# Patient Record
Sex: FEMALE | Race: WHITE | NOT HISPANIC OR LATINO | Employment: FULL TIME | URBAN - METROPOLITAN AREA
[De-identification: names, ages, dates, MRNs, and addresses within clinical notes are randomized per-mention and may not be internally consistent; named-entity substitution may affect disease eponyms.]

---

## 2017-02-27 ENCOUNTER — OFFICE VISIT - RIVER FALLS (OUTPATIENT)
Dept: FAMILY MEDICINE | Facility: CLINIC | Age: 47
End: 2017-02-27

## 2017-02-27 ASSESSMENT — MIFFLIN-ST. JEOR: SCORE: 1760.52

## 2017-06-20 ENCOUNTER — OFFICE VISIT - RIVER FALLS (OUTPATIENT)
Dept: FAMILY MEDICINE | Facility: CLINIC | Age: 47
End: 2017-06-20

## 2017-06-20 ASSESSMENT — MIFFLIN-ST. JEOR: SCORE: 1733.31

## 2018-01-22 ENCOUNTER — OFFICE VISIT - RIVER FALLS (OUTPATIENT)
Dept: FAMILY MEDICINE | Facility: CLINIC | Age: 48
End: 2018-01-22

## 2018-01-22 ASSESSMENT — MIFFLIN-ST. JEOR: SCORE: 1865.76

## 2018-01-29 ENCOUNTER — OFFICE VISIT - RIVER FALLS (OUTPATIENT)
Dept: FAMILY MEDICINE | Facility: CLINIC | Age: 48
End: 2018-01-29

## 2018-08-07 ENCOUNTER — OFFICE VISIT - RIVER FALLS (OUTPATIENT)
Dept: FAMILY MEDICINE | Facility: CLINIC | Age: 48
End: 2018-08-07

## 2018-08-07 ASSESSMENT — MIFFLIN-ST. JEOR: SCORE: 1794.65

## 2019-10-15 ENCOUNTER — OFFICE VISIT - RIVER FALLS (OUTPATIENT)
Dept: FAMILY MEDICINE | Facility: CLINIC | Age: 49
End: 2019-10-15

## 2019-10-15 ASSESSMENT — MIFFLIN-ST. JEOR: SCORE: 1814.95

## 2019-12-30 ENCOUNTER — COMMUNICATION - RIVER FALLS (OUTPATIENT)
Dept: FAMILY MEDICINE | Facility: CLINIC | Age: 49
End: 2019-12-30

## 2020-03-26 ENCOUNTER — COMMUNICATION - RIVER FALLS (OUTPATIENT)
Dept: FAMILY MEDICINE | Facility: CLINIC | Age: 50
End: 2020-03-26

## 2020-10-23 ENCOUNTER — OFFICE VISIT - RIVER FALLS (OUTPATIENT)
Dept: FAMILY MEDICINE | Facility: CLINIC | Age: 50
End: 2020-10-23

## 2020-10-27 ENCOUNTER — COMMUNICATION - RIVER FALLS (OUTPATIENT)
Dept: FAMILY MEDICINE | Facility: CLINIC | Age: 50
End: 2020-10-27

## 2021-05-27 ENCOUNTER — RECORDS - HEALTHEAST (OUTPATIENT)
Dept: ADMINISTRATIVE | Facility: CLINIC | Age: 51
End: 2021-05-27

## 2021-07-07 ENCOUNTER — OFFICE VISIT - RIVER FALLS (OUTPATIENT)
Dept: FAMILY MEDICINE | Facility: CLINIC | Age: 51
End: 2021-07-07

## 2021-07-07 ASSESSMENT — MIFFLIN-ST. JEOR: SCORE: 1867.12

## 2021-07-09 ENCOUNTER — TRANSFERRED RECORDS (OUTPATIENT)
Dept: HEALTH INFORMATION MANAGEMENT | Facility: CLINIC | Age: 51
End: 2021-07-09

## 2021-07-09 ENCOUNTER — COMMUNICATION - RIVER FALLS (OUTPATIENT)
Dept: FAMILY MEDICINE | Facility: CLINIC | Age: 51
End: 2021-07-09

## 2021-07-09 LAB — HPV ABSTRACT: NORMAL

## 2022-02-11 VITALS
HEIGHT: 68 IN | TEMPERATURE: 98.6 F | SYSTOLIC BLOOD PRESSURE: 118 MMHG | OXYGEN SATURATION: 96 % | BODY MASS INDEX: 36.92 KG/M2 | WEIGHT: 243.6 LBS | HEART RATE: 85 BPM | DIASTOLIC BLOOD PRESSURE: 74 MMHG

## 2022-02-12 VITALS
TEMPERATURE: 97.8 F | BODY MASS INDEX: 40.44 KG/M2 | HEIGHT: 68 IN | HEIGHT: 68 IN | TEMPERATURE: 99.2 F | OXYGEN SATURATION: 96 % | DIASTOLIC BLOOD PRESSURE: 84 MMHG | WEIGHT: 266.8 LBS | HEART RATE: 72 BPM | DIASTOLIC BLOOD PRESSURE: 70 MMHG | SYSTOLIC BLOOD PRESSURE: 128 MMHG | BODY MASS INDEX: 41.17 KG/M2 | HEART RATE: 90 BPM | SYSTOLIC BLOOD PRESSURE: 124 MMHG

## 2022-02-12 VITALS
WEIGHT: 252 LBS | TEMPERATURE: 98.6 F | DIASTOLIC BLOOD PRESSURE: 80 MMHG | HEART RATE: 78 BPM | HEIGHT: 67 IN | SYSTOLIC BLOOD PRESSURE: 104 MMHG | BODY MASS INDEX: 39.55 KG/M2

## 2022-02-12 VITALS
BODY MASS INDEX: 38.74 KG/M2 | DIASTOLIC BLOOD PRESSURE: 82 MMHG | WEIGHT: 255.6 LBS | HEART RATE: 81 BPM | SYSTOLIC BLOOD PRESSURE: 120 MMHG | HEIGHT: 68 IN | OXYGEN SATURATION: 98 %

## 2022-02-12 VITALS
DIASTOLIC BLOOD PRESSURE: 68 MMHG | SYSTOLIC BLOOD PRESSURE: 122 MMHG | BODY MASS INDEX: 36.01 KG/M2 | HEIGHT: 68 IN | TEMPERATURE: 98.5 F | HEART RATE: 80 BPM | WEIGHT: 237.6 LBS

## 2022-02-12 VITALS
BODY MASS INDEX: 42.47 KG/M2 | DIASTOLIC BLOOD PRESSURE: 84 MMHG | WEIGHT: 270.6 LBS | SYSTOLIC BLOOD PRESSURE: 131 MMHG | HEART RATE: 77 BPM | HEIGHT: 67 IN | OXYGEN SATURATION: 100 % | TEMPERATURE: 97.1 F

## 2022-02-12 VITALS — OXYGEN SATURATION: 96 %

## 2022-02-16 NOTE — PROGRESS NOTES
Patient:   HAMILTON MAURICE            MRN: [id number]            FIN: [id number]               Age:   48 years     Sex:  Female     :  1970   Associated Diagnoses:   Well adult exam; Depression, Recurrent; Asthma   Author:   Darrick Cr MD      Visit Information   Visit type:  Annual exam.    Source of history:  Self.    History limitation:  None.       Chief Complaint   2018 6:41 PM CDT     Physical      Well Adult History   Well Adult History             The patient presents for well adult exam.  The patient's general health status is described as good.  The patient's diet is described as balanced.  Medical encounters: reviewed illnesses from the winter, has been doing well, no issues with asthma, well controlled.        Review of Systems   All other systems reviewed and negative      Health Status   Allergies:    Allergic Reactions (Selected)  Severity Not Documented  Codeine (No reactions were documented)   Medications:  (Selected)   Prescriptions  Prescribed  Advair Diskus 250 mcg-50 mcg inhalation powder: 1 puff(s), INH, BID, # 3 EA, 3 Refill(s), Type: Maintenance  FLUoxetine 40 mg oral capsule: 2 cap(s) ( 80 mg ), po, daily, Instructions: pt will call when this Rx refill is needed, # 60 cap(s), 0 Refill(s), Type: Maintenance, Pharmacy: [facility] #[id number], patient will call when prescription needs to be filled, 2 cap(s) Oral daily,Instr:pt...  ProAir HFA 90 mcg/inh inhalation aerosol: 2 puff(s), inh, qid, PRN: as needed for wheezing, # 1 EA, 3 Refill(s), Type: Maintenance, Pharmacy: [facility] #[id number], patient will call when prescription needs to be filled, 2 puff(s) inh qid,PRN:as needed for wheezing   Problem list:    All Problems  Asthma / ICD-9-.90 / Confirmed  Depression, Recurrent / ICD-9-.30 / Confirmed  Elevated lipids / ICD-9-.4 / Confirmed  Obese / ICD-9-.00 / Probable  Resolved: ASCUS (Atypical Squamous Cells of Undetermined Significance) on Pap Smear /  ICD-9-.01  Resolved: Pregnancy / SNOMED CT 630162222  Resolved: Pregnancy / SNOMED CT 738422734  Resolved: TMJ (Temporomandibular Joint Disorder) / ICD-9-.60      Histories   Past Medical History:    Active  Asthma (ICD-9-.90)  Depression, Recurrent (ICD-9-.30)  Obese (ICD-9-.00)  Elevated lipids (ICD-9-.4)  Resolved  ASCUS (Atypical Squamous Cells of Undetermined Significance) on Pap Smear (ICD-9-.01): Onset on 2012 at 42 years.  Resolved.  TMJ (Temporomandibular Joint Disorder) (ICD-9-.60):  Resolved.  Pregnancy (SNOMED CT 551480263):  Resolved on 3/25/1997 at 26 years.  Pregnancy (SNOMED CT 026981962):  Resolved on 2001 at 31 years.   Family History:    Heart disease  Father  Cancer  Grandfather (M) ()  Comments:  2012 2:09 PM - Ananya Olson LPN  Lung cancer     Procedure history:    NVD (Normal Vaginal Delivery) (650) on 2001 at 31 Years.  Comments:  2011 10:10 AM - Keisha Nina  Female.  NVD (Normal Vaginal Delivery) (650) on 3/25/1997 at 26 Years.  Comments:  2011 10:10 AM - Keisha Nina  Female.  Cholecystectomy (80950482) on 1996 at 25 Years.  Childbirth (8043652644).  Comments:  2011 8:39 AM - Keisha Nina  G4, P2     Social History:        Alcohol Assessment: Current            1-2 times per week, 1 drinks/episode average.  2 drinks/episode maximum.      Tobacco Assessment: Denies Tobacco Use            Never      Substance Abuse Assessment: Denies Substance Abuse            Never      Employment and Education Assessment            Sales      Home and Environment Assessment            Marital status: Life Partner.  Spouse/Partner name: Lloyd Sandhu.      Nutrition and Health Assessment            Type of diet: Regular.      Exercise and Physical Activity Assessment            Exercise frequency: 3-5 X a wk..  Exercise type: Walking.                     Comments:                      2011 -  Ananya Olson LPN                     Does not exercise.      Sexual Assessment            Sexually active: Yes.  Sexual orientation: Heterosexual.  Other contraceptive use: Vasectomy.      Other Assessment            First menses age 12.  Regular menses.  Menstrual duration 7 days.  Cycle interval 28 days.        Physical Examination   Last Menstrual Period: 7/22/2018   Vital Signs   8/7/2018 6:41 PM CDT Temperature Tympanic 98.6 DegF    Peripheral Pulse Rate 78 bpm    Pulse Site Radial artery    HR Method Manual    Systolic Blood Pressure 104 mmHg    Diastolic Blood Pressure 80 mmHg    Mean Arterial Pressure 88 mmHg    BP Site Left arm    BP Method Manual      Measurements from flowsheet : Measurements   8/7/2018 6:41 PM CDT Height Measured - Standard 67.25 in    Weight Measured - Standard 252 lb    BSA 2.33 m2    Body Mass Index 39.17 kg/m2  HI      General:  Alert and oriented, No acute distress.    Eye:  Pupils are equal, round and reactive to light, Extraocular movements are intact, Normal conjunctiva.    HENT:  Normocephalic, Tympanic membranes are clear, Oral mucosa is moist, No pharyngeal erythema.    Neck:  Supple, Non-tender, No lymphadenopathy, No thyromegaly.    Respiratory:  Lungs are clear to auscultation.    Cardiovascular:  Normal rate, Regular rhythm.    Breast:  No mass, No tenderness, No discharge.    Gastrointestinal:  Soft, Non-tender, Non-distended, No organomegaly.    Musculoskeletal:  Normal range of motion, Normal strength.    Integumentary:  Warm, Dry, Pink, No rash, no concerning lesions.    Neurologic:  Alert, Oriented, Normal sensory.    Psychiatric:  Cooperative, Appropriate mood & affect.       Impression and Plan   Diagnosis     Well adult exam (OUX58-XR Z00.00).     Depression, Recurrent (DTZ61-YM F33.9).     Asthma (IPJ91-ZN J45.909).     Course:  Progressing as expected.    Patient Instructions:       Counseled: Patient, BMI, diet, and exercise.    Orders     Orders (Selected)    Outpatient Orders  Ordered  Return to Clinic (Request): RFV: fasting lab visit: lipid, glucose, Return in 4 weeks  Prescriptions  Prescribed  Advair Diskus 250 mcg-50 mcg inhalation powder: 1 puff(s), INH, BID, # 3 EA, 3 Refill(s), Type: Maintenance, Pharmacy: [facility] #[id number], hold on file, patient will call when needed, 1 puff(s) Inhale bid  FLUoxetine 40 mg oral capsule: 2 cap(s) ( 80 mg ), po, daily, # 180 cap(s), 3 Refill(s), Type: Maintenance, Pharmacy: [facility] #[id number], 2 cap(s) Oral daily  ProAir HFA 90 mcg/inh inhalation aerosol: 2 puff(s), inh, qid, PRN: as needed for wheezing, # 1 EA, 3 Refill(s), Type: Maintenance, Pharmacy: [facility] #[id number], hold on file, patient will call when needed, 2 puff(s) Inhale qid,PRN:as needed for wheezing.

## 2022-02-16 NOTE — TELEPHONE ENCOUNTER
---------------------  From: Brook Cruz MA (Phone Messages Pool ([id number]_[facility]))   To: Darrick Cr MD;     Sent: 2/4/2019 9:31:51 AM CST  Subject: CONSUMER MESSAGE: change of prescription           ---------------------  From: LILIA BURNHAM  To: [facility]  Sent: 02/04/2019 09:28 a.m. CST  Subject: change of prescription  Good morning...    I m wondering if you would consider changing me from Advair to something similar that would be less costly.  I went to fill the Advair on Friday for the first time since the prescription was renewed.  While I need the meds, the cost is just so prohibitive.  I have my prescriptions at [facility] in  now.    Please advise.    Thank you,  Lilia Burnham  [birth year]  [phone]  ** Submitted: **  Order:fluticasone-salmeterol (AirDuo RespiClick 232 mcg-14 mcg/inh inhalation powder)  1 puff(s)  Inhale  bid  Qty:  1 EA        Refills:  11          Substitutions Allowed     Route To Pharmacy - [facility] [id number]    Signed by Darrick Cr MD  2/4/2019 9:45:00 AMLisa,   I sent in AirDuo which is usually less expensive, but the tricky part is that each insurance covers medications differently. Do you recall if you received anything in the mail or online from your insurance that includes a list of preferred drugs? Unfortunately, insurance will not share that directly with clinics or pharmacies, but will often have it available to their insured patients. If the airduo is also too expensive and you have access to a preferred drug list, I'm happy to take a peek and help find a better option for you. You can also check online by googling the name of the medication (Advair or AirDuo) and look for copay cards that can decrease the cost of the medication.  Hope this helps. Let me know what more I can do.  Tiny Cr---------------------  From: Shaye SWEET, Darrick   To: Phone Messages Pool ([id number]_[facility]); LILIA BURNHAM    Sent:  2/4/2019 9:51:30 AM CST  Subject: RE: CONSUMER MESSAGE: change of prescription

## 2022-02-16 NOTE — LETTER
(Inserted Image. Unable to display)   September 08, 2021  HAMILTON MAURICE  [address] [place] [address]          Dear HAMILTON,      Thank you for selecting [facility] for your healthcare needs.    Our records indicate you are due for the following services:     Screening Test for Colon Cancer, called InSure FIT ~ Stool cards were sent home with you within the past 3 months and have not been returned to us for testing. You may either drop off your stool cards at your clinic, or send them to us in the mail.    (FYI   Regarding office visits: In some instances, a video visit or telephone visit may be offered as an option.)        To schedule an appointment or if you have further questions, please contact your clinic at [phone].      Powered by [employer]    Sincerely,    Darrick Cr M.D.

## 2022-02-16 NOTE — NURSING NOTE
Comprehensive Intake Entered On:  7/7/2021 4:04 PM CDT    Performed On:  7/7/2021 4:00 PM CDT by Cait Yousif CMA               Summary   Chief Complaint :   Physical - Last menses - January, no previous colon screen- no FHX. ,mammo scheduled 7/27   Menstrual Status :   Menarcheal   Weight Measured :   270.6 lb(Converted to: 270 lb 10 oz, 122.742 kg)    Height Measured :   66.5 in(Converted to: 5 ft 6 in, 168.91 cm)    Body Mass Index :   43.02 kg/m2 (HI)    Body Surface Area :   2.4 m2   Systolic Blood Pressure :   131 mmHg (HI)    Diastolic Blood Pressure :   84 mmHg (HI)    Mean Arterial Pressure :   100 mmHg   Peripheral Pulse Rate :   77 bpm   Temperature Tympanic :   97.1 DegF(Converted to: 36.2 DegC)  (LOW)    Oxygen Saturation :   100 %   Cait Yousif CMA - 7/7/2021 4:00 PM CDT   Health Status   Hospitalized since last visit? :   No   Inpatient? :   No   ED? :   No   Related to Condition :   Asthma   Cait Yousif CMA - 7/7/2021 4:57 PM CDT   Allergies Verified? :   Yes   Medication History Verified? :   Yes   Immunizations Current :   Yes   Medical History Verified? :   Yes   Pre-Visit Planning Status :   Completed   Tobacco Use? :   Never smoker   Cait Yousif CMA - 7/7/2021 4:00 PM CDT   Social History   Social History   (As Of: 7/7/2021 4:04:52 PM CDT)   Alcohol:  Current      1-2 times per week, 1 drinks/episode average.  2 drinks/episode maximum.   (Last Updated: 6/20/2017 5:33:41 PM CDT by Ananya Olson LPN)          Tobacco:  Denies Tobacco Use      Never   (Last Updated: 11/2/2011 4:07:58 PM CDT by Ananya Olson LPN)   Never (less than 100 in lifetime)   (Last Updated: 7/7/2021 4:01:55 PM CDT by Cait Yousif CMA)          Electronic Cigarette/Vaping:        Electronic Cigarette Use: Never.   (Last Updated: 7/7/2021 4:01:59 PM CDT by Cait Yousif CMA)          Substance Abuse:  Denies Substance Abuse      Never   (Last Updated: 11/2/2011 4:08:08 PM CDT by Ananya Olson LPN)           Employment/School:        Work/School description: .   (Last Updated: 8/28/2018 1:40:24 PM CDT by Keisha Nina)          Home/Environment:        Marital status: Life Partner.  Spouse/Partner name: Lloyd Sandhu.   (Last Updated: 1/8/2013 12:16:33 PM CST by Keisha Nina)          Nutrition/Health:        Type of diet: Regular.   (Last Updated: 11/2/2011 4:08:44 PM CDT by Ananya Olson LPN)          Exercise:  Regular exercise      Exercise frequency: 3-4 times/week.  Exercise type: Walking.   Comments:  11/2/2011 4:08 PM - Ananya Olson LPN: Does not exercise.   (Last Updated: 8/28/2018 1:50:13 PM CDT by Keisha Nina)          Sexual:        Sexually active: Yes.  Sexual orientation: Heterosexual.  Other contraceptive use: Vasectomy.   (Last Updated: 1/8/2013 12:16:48 PM CST by Keisha Nina)          Other:        First menses age 12.  Regular menses.  Menstrual duration 7 days.  Cycle interval 28 days.  History of abnormal Pap.   (Last Updated: 8/28/2018 1:39:05 PM CDT by Keisha Nina)

## 2022-02-16 NOTE — NURSING NOTE
Asthma Control Test (ACT) Total Entered On:  7/7/2021 4:57 PM CDT    Performed On:  7/7/2021 4:57 PM CDT by Cait Yousif CMA               Asthma Control Test (ACT) Total   Asthma Control Test Total (Adult) :   24    Cait Yousif CMA - 7/7/2021 4:57 PM CDT

## 2022-02-16 NOTE — LETTER
(Inserted Image. Unable to display)   [address] [facility] [address]  [phone] (phone) [phone] (fax)  July 30, 2021        HAMILTON MAURICE  [address] [place] [address]      Dear HAMILTON,      Thank you for selecting [facility] for your [facility] needs.      This letter is to inform you that we have received a copy of your mammogram results.  Your results were normal.  You will also receive notice of your results from the radiologist within 7-10 days.  This letter is to let you know I have also received a copy and it is filed in your clinic chart.      Please plan on having your next mammogram done in 12 months.       Please contact me or my assistant at [phone] if you have any questions or concerns.     Sincerely,      Darrick Cr MD

## 2022-02-16 NOTE — TELEPHONE ENCOUNTER
---------------------  From: Vida Mitchell CMA (Phone Messages Pool ([id number]_[facility]))   To: LILIA BURNHAM    Sent: 3/26/2020 1:31:11 PM CDT  Subject: RE: Asthma Action Plan     Thank you!      ---------------------  From: LILIA BURNHAM  To: [facility]  Sent: 03/26/2020 12:05 p.m. CDT  Subject: Asthma Action Plan  Good afternoon!    Thank you for sending the Asthma Action Plan. Since we aren t able to reply in the form itself, please use this as my response.    Thankfully I have had no issues with my asthma in the recent months. I had a slight cold about a month ago that caused a bit of a challenge but it went away within a day. I have stocked up on all of my meds in the event that I need them and they are hard to get during the crisis.    Blessed to have a job that I m working from home and don t intend to leave until the air is clear, every where.    Be safe everyone and thank you for ALL that you do!  Lilia Burnham

## 2022-02-16 NOTE — TELEPHONE ENCOUNTER
---------------------  From: Xiomara Coy RN (Phone Messages Pool ([id number]_[facility]))   To: Advanced Practice Provider Pool ([id number]_[facility]);     Sent: 9/17/2021 1:37:53 PM CDT  Subject: Inhaler Issues        PCP:   MAYA      Time of Call:  1:20       Person Calling:  Pt  Phone number:  [phone]    Note:   Pt calling to say that she went to  the new Advair inhaler that was sent in on 9/15/21 was going to cost her $500.  She really wants the fluticasone-salmeterol 232/14 that was sent in July 7th, 2021 but [facility] said they are all out of it.   Note in chart says that [facility] called to tell us that it is not covered by her insurance. She is wondering if there are any other types of inhalers that are similar and not $500.  She is completely out of her inhaler now.  Pt states she is in town and will be for a little bit and is hoping this can be addressed quickly.  Pt informed that [facility] is [facility] but will reach out to other providers for assistance.     Last office visit and reason:  7/8/21 Well Adult Visit---------------------  From: Filomena Collins PA-C (Advanced Practice Provider Pool (32224[facility]))   To: Phone Messages Pool ([id number]_[place] [facility] [place] [facility]);     Sent: 9/17/2021 2:57:53 PM CDT  Subject: RE: Inhaler Issues      It looks like the advair discus 250 may be covered (better).  I sent RX.    All are going to be expensive generally, but if ongoing issues we can have her discuss with Rissa Woody.Called pt to inform her that this new inhaler was sent in to [facility].  Pt was advised that all inhalers are generally expensive and if this one is also close to $500, she might want to meet with Sofia to discuss other options.  Pt was thankful that this was taken care of in a timely fashion and that there was a third option for her to get the medication she needs.

## 2022-02-16 NOTE — NURSING NOTE
CAGE Assessment Entered On:  10/15/2019 5:56 PM CDT    Performed On:  10/15/2019 5:56 PM CDT by Holly Goodwin CMA               Assessment   Have you ever felt you should cut down on your drinking :   No   Have people annoyed you by criticizing your drinking :   No   Have you ever felt bad or guilty about your drinking :   No   Have you ever taken a drink first thing in the morning to steady your nerves or get rid of a hangover (Eye-opener) :   No   CAGE Score :   0    Holly Goodwin CMA - 10/15/2019 5:56 PM CDT

## 2022-02-16 NOTE — NURSING NOTE
Depression Screening Entered On:  10/27/2020 4:13 PM CDT    Performed On:  10/27/2020 4:13 PM CDT by Mitra Milner CMA               Depression Screening   Little Interest - Pleasure in Activities :   Not at all   Feeling Down, Depressed, Hopeless :   Not at all   Initial Depression Screen Score :   0 Score   Poor Appetite or Overeating :   Not at all   Trouble Falling or Staying Asleep :   Not at all   Feeling Tired or Little Energy :   Not at all   Feeling Bad About Yourself :   Not at all   Trouble Concentrating :   Not at all   Moving or Speaking Slowly :   Not at all   Thoughts Better Off Dead or Hurting Self :   Not at all   MAURICIO Difficulty with Work, Home, Others :   Not difficult at all   Detailed Depression Screen Score :   0    Total Depression Screen Score :   0    Mitra Milner CMA - 10/27/2020 4:13 PM CDT   spontaneous/unlabored

## 2022-02-16 NOTE — TELEPHONE ENCOUNTER
---------------------  From: Darrick Cr MD   To: Mitra Milner CMA;     Sent: 9/15/2021 3:59:03 PM CDT  Subject: RE: General Message           Entered by Darrick Cr MD on September 15, 2021 3:58:53 PM CDT    ** Submitted: **  Order:fluticasone-salmeterol (Advair  mcg-21 mcg/inh inhalation aerosol)  2 puff(s)  Inhale  bid  Qty:  1 EA        Refills:  11          Substitutions Allowed     Route To Pharmacy - [facility] #[id number]    Signed by Darrick Cr MD  9/15/2021 8:57:00 PM [facility]            ---------------------  From: Mitra Milner CMA   To: Darrick Cr MD;     Sent: 9/15/2021 3:55:12 PM CDT  Subject: General Message     Regarding fax from [employer] is not covered    Advair /21 or Advair Diskus 250 is covered and closest to [employer] per pharmacy.

## 2022-02-16 NOTE — PROGRESS NOTES
Chief Complaint    Curbside O2 check after video visit.  History of Present Illness      Today's visit was conducted via video due to the COVID-19 pandemic. PT consent to video visit was obtained and documented       Call Start Time:  1:08      Call End Time:   1:22      Pt reports she was diagnosed with Covid 19 tested at another facility.  Her  was positive as well.  She has known asthma, well controlled with Advair.  She has had sx for 10 days.  She was generally feeling better but then this am worse again. Had  a severe bout of cough this am, followed by sob with ambulation.  she denies sob at rest.  She is no longer with fever but does have chills a times.  Temp running lower than normal at times well.  No chest pain.  Getting along well at home otherwise, very concerned about asthma exacerbation as well as worsening sx over the weekend.  In need of albuterol refill.  Feels tight and wheezy like her typical asthma exacerbatoins. Wonders if antibiotic needed.       also requests refill of her advair and her antidepressant. Given her covid 19 illashley has not been able to get into the clinic.        AAP and ACT were updated 6 months ago.   Review of Systems      Review of systems is negative with the exception of those noted in HPI          Physical Exam   Vitals & Measurements    SpO2: 96%            nad appears well. Able to talk in full sentance.  No tachypnea or dyspnea noted.       Drive up curbside O2 sat done today 96% on room air.  Assessment/Plan       1. Asthma with acute exacerbation (J45.901)         short coures of prednisine and albuterol refilled. Refilled Advair as well.  Close observation. no indication for antibiotics but if worsening, fevers, she should be seen in clinic or ED.  She is agreeable with plan.  ACT/AAP were updated and well controlled until recent covid 19 virus.       2. COVID-19 (U07.1)         as above       3. Depression, Recurrent (F33.9)         1 month refill of her  SSRI, would like her to fu with pcp with in a month  to do yearly recheck.  Pt agreeable.       Orders:         albuterol, 2 puff(s), Inhale, q4 hrs, # 17 gm, 3 Refill(s), Type: Maintenance, Pharmacy: [facility] #[id number], 2 puff(s) Inhale q4 hrs, 67.5, in, 10/15/19 16:25:00 CDT, Height Measured, 255.6, lb, 10/15/19 16:25:00 CDT, Weight Measured, (Ordered)         FLUoxetine, = 2 cap(s), Oral, daily, # 60 cap(s), 0 Refill(s), Type: Maintenance, Pharmacy: [facility] #[id number], Due for annual Px prior to next refill., 2 cap(s) Oral daily, 67.5, in, 10/15/19 16:25:00 CDT, Height Measured, 255.6, lb, 10/15/19 16:25:00 C..., (Ordered)         FLUoxetine, = 2 cap(s), Oral, daily, # 180 cap(s), 3 Refill(s), Type: Hard Stop, Pharmacy: [facility] #[id number], Due for annual Px prior to next refill., (Completed)         fluticasone-salmeterol, 1 puff, Inhale, bid, # 1 EA, 11 Refill(s), Type: Maintenance, Pharmacy: [facility] #[id number], 1 puff Inhale bid, 67.5, in, 10/15/19 16:25:00 CDT, Height Measured, 255.6, lb, 10/15/19 16:25:00 CDT, Weight Measured, (Ordered)         fluticasone-salmeterol, 1 puff, Inhale, bid, # 1 EA, 11 Refill(s), Type: Hard Stop, (Completed)         predniSONE, = 2 tab(s) ( 40 mg ), Oral, daily, x 5 day(s), # 10 tab(s), 0 Refill(s), Type: Acute, Pharmacy: [facility] #[id number], 2 tab(s) Oral daily,x5 day(s), 67.5, in, 10/15/19 16:25:00 CDT, Height Measured, 255.6, lb, 10/15/19 16:25:00 CDT, Weight Lucia..., (Ordered)  Patient Information     Name:HAMILTON MAURICE      Address:      [address] [place] [id number]     Sex:Female     YOB: 1970     [phone]     Emergency Contact:NANDO TEIXEIRA     MRN:[id number]     FIN:[id number]     Location:[facility]     Date of Service:10/23/2020      Primary Care Physician:       Shaye SWEET, Darrick, [phone]      Attending Physician:       Karina SHELDON, Filomena HANNON, [phone]  Problem List/Past Medical History    Ongoing     Asthma     Depression, Recurrent     Obese    Historical     ASCUS (Atypical Squamous Cells of Undetermined Significance) on Pap Smear     Elevated lipids     Pregnancy     Pregnancy     TMJ (Temporomandibular Joint Disorder)  Procedure/Surgical History     NVD (Normal Vaginal Delivery) (08/16/2001)            Comments: Female..     NVD (Normal Vaginal Delivery) (03/25/1997)            Comments: Female..     Cholecystectomy (01/01/1996)           Childbirth            Comments: G4, P2.  Medications    Advair Diskus 250 mcg-50 mcg inhalation powder, 1 puff(s), Inhale, bid, 11 refills    albuterol 90 mcg/inh inhalation aerosol, 2 puff(s), Inhale, q4 hrs, 3 refills    FLUoxetine 40 mg oral capsule, 2 cap(s), Oral, daily    fluticasone-salmeterol 232 mcg-14 mcg/inh inhalation powder, 1 inh, Inhale, bid, 11 refills    fluticasone-salmeterol CFC free 230 mcg-21 mcg/inh inhalation aerosol, 1 puff, Inhale, bid, 11 refills    predniSONE 20 mg oral tablet, 40 mg= 2 tab(s), Oral, daily    ProAir HFA 90 mcg/inh inhalation aerosol, 2 puff(s), Inhale, qid, PRN, 3 refills  Allergies    codeine  Social History    Smoking Status     Never smoker     Alcohol - Current      1-2 times per week, 1 drinks/episode average. 2 drinks/episode maximum.     Employment/School      Work/School description: .     Exercise - Regular exercise      Exercise frequency: 3-4 times/week. Exercise type: Walking.     Home/Environment      Marital status: Life Partner. Spouse/Partner name: Lloyd Sandhu.     Nutrition/Health      Type of diet: Regular.     Other      First menses age 12. Regular menses. Menstrual duration 7 days. Cycle interval 28 days. History of abnormal Pap.     Sexual      Sexually active: Yes. Sexual orientation: Heterosexual. Other contraceptive use: Vasectomy.     Substance Abuse - Denies Substance Abuse      Never     Tobacco - Denies Tobacco Use       Never  Family History    CVA - Cerebrovascular accident: Father.    Cancer: Grandfather (M).    Heart disease: Father.  Immunizations      Vaccine Date Status          pneumococcal (PPSV23) 08/07/2018 Given          tetanus/diphth/pertuss (Tdap) adult/adol 12/05/2012 Given          DTaP 12/05/2012 Recorded          Td 09/22/2003 Recorded          influenza virus vaccine, inactivated 01/22/2002 Recorded

## 2022-02-16 NOTE — TELEPHONE ENCOUNTER
---------------------  From: Vida Mitchell CMA   To: HAMILTON MAURICE    Sent: 3/26/2020 11:00:38 AM CDT  Subject: Asthma Update     Our records show that you are due to complete your Asthma Control Action Plan. Please complete the form below and send it back. If you have any questions please let us know. Thank you in advance for updating your Asthma Action Plan.    If you have any concerns about your Asthma please call our clinic at [phone] and ask speak with one of the nurses.     Thank You    1. In the last 4 weeks how much time did our asthma keep you from getting as much done at work, school or at home?   1. All of the time  2. Most of the time  3. Some of the time  4. A little of the time  5. None of the time  1. During the past 4 weeks, how often have you had shortness of breath?  1. More than once a day  2. Once a day  3. 3-6 times a week  4. Once or twice a week  5. Not at all  1. During the past 4 weeks, how often did your asthma symptoms (wheezing, coughing, shortness of breath, chest tightness or pain) wake you up at night or earlier in the morning?  1. 4 or more nights a week  2. 2 or 3 nights a week  3. Once a week  4. Once or twice   5. Not at all  1. During the past 4 weeks, how often have you used your rescue inhaler or nebulizer medication (such as albuterol)?  1. 3 or more times per day  2. 1 or 2 times per day  3. 2 or 3 times per week  4. Once a week or less  5. Not at all  1. How would you rate your asthma control during the past 4 weeks?  1. Not controlled at all  2. Poorly controlled  3. Somewhat controlled  4. Well controlled  5. Completely controlled    In the past 12 months, how many emergency department visits have you had due to asthma (that did not result in a hospitalization)? YES or NO  In the past 12 months, how many inpatient hospitalizations have had due to asthma?

## 2022-02-16 NOTE — PROGRESS NOTES
Chief Complaint    Depression med check; discuss pneumonia vaccine  History of Present Illness      1. depression follow up, mood has been down a little, attributes to weight gain, has gotten off track, denies need for any assistance, would like to try lifestyle changes before any medication changes, continue fluoxetine, refills needed      2. asthma generally well controlled, does not use advair as regularly as she should due to cost, no other concerns      preventive screenings are up to date  Physical Exam   Vitals & Measurements    HR: 81(Peripheral)  BP: 120/82  SpO2: 98%     HT: 67.5 in  WT: 255.6 lb  BMI: 39.44       patient is alert and oriented, cooperative, in no distress      eyes: PERRL, EOM intact, normal conjunctiva      heent: normocephalic, TMs normal, canals patent, no sinus tenderness, oral mucosa moist, no oral lesions, normal hypopharynx      neck: supple, no lymphadenopathy, no thyromegaly      CV: RRR, no murmur, no edema, normal peripheral perfusion      lungs: clear and equal bilaterally, no rales, rhonchi, or wheezes   Assessment/Plan       1. Depression, Recurrent (F33.9)         mild increase on PHQ9, patient will continue current medication, increase exercise, monitor diet, follow up as needed if any further concerns         Ordered:          FLUoxetine, = 2 cap(s), Oral, daily, # 180 cap(s), 3 Refill(s), Type: Maintenance, Pharmacy: [facility] #[id number], Due for annual Px prior to next refill., (Ordered)                2. Asthma (J45.909)         well controlled, will check with pharmacy which formulation of fluticasone-salmeterol is least expensive, let us know if still unable to afford so we can look into alternatives         Ordered:          albuterol, 2 puff(s), inh, qid, PRN: as needed for wheezing, # 1 EA, 3 Refill(s), Type: Maintenance, Pharmacy: [facility] #[id number], hold on file, patient will call when needed, (Ordered)          fluticasone-salmeterol, 1 puff(s),  INH, BID, # 1 EA, 11 Refill(s), Type: Maintenance, Pharmacy: [facility] #[id number], hold on file, patient will call when needed, (Ordered)          fluticasone-salmeterol, 1 puff, Inhale, bid, # 1 EA, 11 Refill(s), Type: Maintenance, (Ordered)          fluticasone-salmeterol, 1 inh, Inhale, bid, # 1 EA, 11 Refill(s), Type: Maintenance, (Ordered)           Patient Information     Name:HAMILTON MAURICE      Address:      [address] [place], [place] [id number]     Sex:Female     YOB: 1970     [phone]     Emergency Contact:NANDO TEIXEIRA     MRN:[id number]     FIN:[id number]     Location:[facility]     Date of Service:10/15/2019      Primary Care Physician:       Darrick Cr MD, [phone]      Attending Physician:       Darrick Cr MD, [phone]  Problem List/Past Medical History    Ongoing     Asthma     Depression, Recurrent     Obese    Historical     ASCUS (Atypical Squamous Cells of Undetermined Significance) on Pap Smear     Elevated lipids     Pregnancy     Pregnancy     TMJ (Temporomandibular Joint Disorder)  Procedure/Surgical History     NVD (Normal Vaginal Delivery) (08/16/2001)            Comments: Female..     NVD (Normal Vaginal Delivery) (03/25/1997)            Comments: Female..     Cholecystectomy (01/01/1996)           Childbirth            Comments: G4, P2.  Medications    Advair Diskus 250 mcg-50 mcg inhalation powder, 1 puff(s), Inhale, bid, 11 refills    FLUoxetine 40 mg oral capsule, 2 cap(s), Oral, daily, 3 refills    fluticasone-salmeterol 232 mcg-14 mcg/inh inhalation powder, 1 inh, Inhale, bid, 11 refills    fluticasone-salmeterol CFC free 230 mcg-21 mcg/inh inhalation aerosol, 1 puff, Inhale, bid, 11 refills    ProAir HFA 90 mcg/inh inhalation aerosol, 2 puff(s), Inhale, qid, PRN, 3 refills  Allergies    codeine  Social History    Smoking Status - 10/15/2019     Never smoker     Alcohol - Current, 01/19/2011      1-2  times per week, 1 drinks/episode average. 2 drinks/episode maximum., 06/20/2017     Employment/School      Work/School description: ., 08/28/2018     Exercise - Regular exercise, 08/28/2018      Exercise frequency: 3-4 times/week. Exercise type: Walking., 08/28/2018     Home/Environment      Marital status: Life Partner. Spouse/Partner name: Lloyd Sandhu., 01/08/2013     Nutrition/Health      Type of diet: Regular., 11/02/2011     Other      First menses age 12. Regular menses. Menstrual duration 7 days. Cycle interval 28 days. History of abnormal Pap., 08/28/2018     Sexual      Sexually active: Yes. Sexual orientation: Heterosexual. Other contraceptive use: Vasectomy., 01/08/2013     Substance Abuse - Denies Substance Abuse, 01/08/2013      Never, 11/02/2011     Tobacco - Denies Tobacco Use, 01/19/2011      Never, 11/02/2011  Family History    CVA - Cerebrovascular accident: Father.    Cancer: Grandfather (M).    Heart disease: Father.  Immunizations      Vaccine Date Status      pneumococcal (PPSV23) 08/07/2018 Given      tetanus/diphth/pertuss (Tdap) adult/adol 12/05/2012 Given      DTaP 12/05/2012 Recorded      Td 09/22/2003 Recorded      influenza virus vaccine, inactivated 01/22/2002 Recorded

## 2022-02-16 NOTE — PROGRESS NOTES
Patient:   HAMILTON MAURICE            MRN: [id number]            FIN: [id number]               Age:   47 years     Sex:  Female     :  1970   Associated Diagnoses:   Atypical pneumonia   Author:   Darrick Cr MD      Chief Complaint   2018 8:26 AM CST    c/o cough x 12 days; chills at night x 2 days        History of Present Illness   Patient with cough x 12 days, occasionally productive.  Then over the past 48 hours chills, increased cough, sore throat, ear fullness      Health Status   Allergies:    Allergic Reactions (All)  Severity Not Documented  Codeine (No reactions were documented)   Medications:  (Selected)   Prescriptions  Prescribed  Advair Diskus 250 mcg-50 mcg inhalation powder: 1 puff(s), INH, BID, # 3 EA, 3 Refill(s), Type: Maintenance  FLUoxetine 40 mg oral capsule: 2 cap(s) ( 80 mg ), po, daily, Instructions: pt will call when this Rx refill is needed, # 180 cap(s), 3 Refill(s), Type: Maintenance, Pharmacy: [facility] #[id number], patient will call when prescription needs to be filled, 2 cap(s) po daily,Instr:pt...  ProAir HFA 90 mcg/inh inhalation aerosol: 2 puff(s), inh, qid, PRN: as needed for wheezing, # 1 EA, 3 Refill(s), Type: Maintenance, Pharmacy: [facility] #[id number], patient will call when prescription needs to be filled, 2 puff(s) inh qid,PRN:as needed for wheezing   Problem list:    All Problems (Selected)  Obese / ICD-9-.00 / Probable  Elevated lipids / ICD-9-.4 / Confirmed  Depression, Recurrent / ICD-9-.30 / Confirmed  Asthma / ICD-9-.90 / Confirmed      Histories   Past Medical History:    Active  Asthma (ICD-9-.90)  Depression, Recurrent (ICD-9-.30)  Obese (ICD-9-.00)  Elevated lipids (ICD-9-.4)  Resolved  ASCUS (Atypical Squamous Cells of Undetermined Significance) on Pap Smear (ICD-9-.01): Onset on 2012 at 42 years.  Resolved.  TMJ (Temporomandibular Joint Disorder) (ICD-9-.60):  Resolved.  Pregnancy  (SNOMED CT 811091110):  Resolved on 3/25/1997 at 26 years.  Pregnancy (SNOMED CT 402797156):  Resolved on 2001 at 31 years.   Family History:    Heart disease  Father  Cancer  Grandfather (M) ()  Comments:  2012 2:09 PM - Whitney MILLEROlesyan  Lung cancer     Procedure history:    NVD (Normal Vaginal Delivery) (650) on 2001 at 31 Years.  Comments:  2011 10:10 AM - Keisha Nina  Female.  NVD (Normal Vaginal Delivery) (650) on 3/25/1997 at 26 Years.  Comments:  2011 10:10 AM - Keisha Nina  Female.  Cholecystectomy (37339889) on 1996 at 25 Years.  Childbirth (0870760424).  Comments:  2011 8:39 AM - Keisha Nina  G4, P2     Social History:        Alcohol Assessment: Current            1-2 times per week, 1 drinks/episode average.  2 drinks/episode maximum.      Tobacco Assessment: Denies Tobacco Use            Never      Substance Abuse Assessment: Denies Substance Abuse            Never      Employment and Education Assessment            Sales      Home and Environment Assessment            Marital status: Life Partner.  Spouse/Partner name: Lloyd Sandhu.      Nutrition and Health Assessment            Type of diet: Regular.      Exercise and Physical Activity Assessment            Exercise frequency: 3-5 X a wk..  Exercise type: Walking.                     Comments:                      2011 - Whitney MILLERAnanya                     Does not exercise.      Sexual Assessment            Sexually active: Yes.  Sexual orientation: Heterosexual.  Other contraceptive use: Vasectomy.      Other Assessment            First menses age 12.  Regular menses.  Menstrual duration 7 days.  Cycle interval 28 days.        Physical Examination   Vital Signs   2018 8:26 AM CST Temperature Tympanic 97.8 DegF  LOW    Peripheral Pulse Rate 72 bpm    Pulse Site Radial artery    HR Method Manual    Systolic Blood Pressure 124 mmHg    Diastolic Blood Pressure 70 mmHg    Mean Arterial  Pressure 88 mmHg    BP Site Left arm    BP Method Manual      Measurements from flowsheet : Measurements   1/22/2018 8:26 AM CST Height Measured - Standard 67.5 in    Weight Measured - Standard 266.8 lb    BSA 2.4 m2    Body Mass Index 41.17 kg/m2  HI      General:  Alert and oriented, No acute distress.    Eye:  Pupils are equal, round and reactive to light, Normal conjunctiva.    HENT:  Normocephalic, Tympanic membranes are clear, Oral mucosa is moist, No pharyngeal erythema, No sinus tenderness.    Neck:  Supple, Non-tender, No lymphadenopathy.    Respiratory:       Breath sounds: Bilateral, Rhonchi present, diffusely.    Cardiovascular:  Normal rate, Regular rhythm.       Impression and Plan   Diagnosis     Atypical pneumonia (COF32-RN J18.9).     Course:  Not improving.    Summary:  Resent Advair as requested..    Orders     Orders   Pharmacy:  azithromycin 250 mg oral tablet (Prescribe): 1 packet(s), PO, Once, Instructions: as directed on package labeling, # 6 tab(s), 0 Refill(s), Type: Soft Stop, Pharmacy: [facility] #[id number], 1 packet(s) po once,Instr:as directed on package labeling.

## 2022-02-16 NOTE — NURSING NOTE
Depression Screening Entered On:  10/15/2019 5:56 PM CDT    Performed On:  10/15/2019 5:56 PM CDT by Holly Goodwin CMA               Depression Screening   Little Interest - Pleasure in Activities :   Not at all   Feeling Down, Depressed, Hopeless :   Not at all   Initial Depression Screen Score :   0    Trouble Falling or Staying Asleep :   Not at all   Feeling Tired or Little Energy :   Not at all   Poor Appetite or Overeating :   Not at all   Feeling Bad About Yourself :   Not at all   Trouble Concentrating :   Not at all   Moving or Speaking Slowly :   Not at all   Thoughts Better Off Dead or Hurting Self :   Not at all   Detailed Depression Screen Score :   0    Total Depression Screen Score :   0    MAURICIO Difficulty with Work, Home, Others :   Somewhat difficult   Holly Goodwin CMA - 10/15/2019 5:56 PM CDT

## 2022-02-16 NOTE — TELEPHONE ENCOUNTER
---------------------  From: Ramona Tavarez CMA (Phone Messages Pool ([id number]_[facility]))   To: HAMILTON BURNHAM    Sent: 10/27/2020 1:58:07 PM CDT  Subject: RE: checking back     Hamilton,    Your previous message was sent, although Dr Cr does not start in clinic until 3pm today. She will address your message after this time.    Ramona HIGGINS CMA      ---------------------  From: HAMILTON BURNHAM  To: [facility]  Sent: 10/27/2020 01:41 p.m. CDT  Subject: checking back  Hoping for a resolution/response on my message from earlier this morning concerning a refill on prednisone, etc.    Thank you,  Hamilton Burnham

## 2022-02-16 NOTE — PROGRESS NOTES
Patient:   HAMILTON MAURICE            MRN: [id number]            FIN: [id number]               Age:   47 years     Sex:  Female     :  1970   Associated Diagnoses:   Asthma; Cough   Author:   Cristopher Mixon MD      Chief Complaint   2018 8:02 AM CST    c/o cough, chest congestion, body aches continue     Chief complaint and symptoms noted above confirmed with patient.      History of Present Illness             The patient presents with cough.  The cough is described as barking and hacking.  The severity of the cough is moderate.  The cough is constant.  Resolved with prednisone but returned right away after stopping.  Exacerbating factors consist of none.  Relieving factors consist of beta-agonist, rest, sitting upright and Steroids.        Review of Systems   Constitutional:  Negative except as documented in history of present illness.    Ear/Nose/Mouth/Throat:  Negative.    Respiratory:  Negative except as documented in history of present illness.    Cardiovascular:  Negative.       Health Status   Allergies:    Allergic Reactions (Selected)  Severity Not Documented  Codeine (No reactions were documented)   Medications:  (Selected)   Prescriptions  Prescribed  Advair Diskus 250 mcg-50 mcg inhalation powder: 1 puff(s), INH, BID, # 3 EA, 3 Refill(s), Type: Maintenance  FLUoxetine 40 mg oral capsule: 2 cap(s) ( 80 mg ), po, daily, Instructions: pt will call when this Rx refill is needed, # 180 cap(s), 3 Refill(s), Type: Maintenance, Pharmacy: [facility] #[id number], patient will call when prescription needs to be filled, 2 cap(s) po daily,Instr:pt...  ProAir HFA 90 mcg/inh inhalation aerosol: 2 puff(s), inh, qid, PRN: as needed for wheezing, # 1 EA, 3 Refill(s), Type: Maintenance, Pharmacy: [facility] #[id number], patient will call when prescription needs to be filled, 2 puff(s) inh qid,PRN:as needed for wheezing  predniSONE 10 mg oral tablet: See Instructions, Instructions: 4 tabs daily for 4 days then  3 tabs daily for 4 days then 2 tabs daily for 4 days then 1 tab daily for 4 days then 1/2 tab daily for 4 days, # 42 tab(s), 0 Refill(s), Type: Maintenance, Pharmacy: [facility] #[id number],...   Problem list:    All Problems (Selected)  Asthma / ICD-9-.90 / Confirmed  Depression, Recurrent / ICD-9-.30 / Confirmed  Elevated lipids / ICD-9-.4 / Confirmed  Obese / ICD-9-.00 / Probable      Histories   Past Medical History:    Active  Asthma (ICD-9-.90)  Depression, Recurrent (ICD-9-.30)  Obese (ICD-9-.00)  Elevated lipids (ICD-9-.4)  Resolved  ASCUS (Atypical Squamous Cells of Undetermined Significance) on Pap Smear (ICD-9-.01): Onset on 2012 at 42 years.  Resolved.  TMJ (Temporomandibular Joint Disorder) (ICD-9-.60):  Resolved.  Pregnancy (SNOMED CT 013211137):  Resolved on 3/25/1997 at 26 years.  Pregnancy (SNOMED CT 978990718):  Resolved on 2001 at 31 years.   Family History:    Heart disease  Father  Cancer  Grandfather (M) ()  Comments:  2012 2:09 PM - Ananya Olson LPN  Lung cancer     Procedure history:    NVD (Normal Vaginal Delivery) (ICD-9-) on 2001 at 31 Years.  Comments:  2011 10:10 AM - Keisha Nina  Female.  NVD (Normal Vaginal Delivery) (ICD-9-) on 3/25/1997 at 26 Years.  Comments:  2011 10:10 AM - Keisha Nina  Female.  Cholecystectomy (SNOMED CT 81830344) on 1996 at 25 Years.  Childbirth (SNOMED CT 0155334842).  Comments:  2011 8:39 AM - Keisha Nina  G4, P2     Social History:        Alcohol Assessment: Current            1-2 times per week, 1 drinks/episode average.  2 drinks/episode maximum.      Tobacco Assessment: Denies Tobacco Use            Never      Substance Abuse Assessment: Denies Substance Abuse            Never      Employment and Education Assessment            Sales      Home and Environment Assessment            Marital status: Life Partner.  Spouse/Partner name:  Lloyd Sandhu.      Nutrition and Health Assessment            Type of diet: Regular.      Exercise and Physical Activity Assessment            Exercise frequency: 3-5 X a wk..  Exercise type: Walking.                     Comments:                      11/02/2011 - Whitney MILLERAnanya                     Does not exercise.      Sexual Assessment            Sexually active: Yes.  Sexual orientation: Heterosexual.  Other contraceptive use: Vasectomy.      Other Assessment            First menses age 12.  Regular menses.  Menstrual duration 7 days.  Cycle interval 28 days.        Physical Examination   Vital Signs   1/29/2018 8:02 AM CST Temperature Tympanic 99.2 DegF    Peripheral Pulse Rate 90 bpm    Pulse Site Radial artery    HR Method Electronic    Systolic Blood Pressure 128 mmHg    Diastolic Blood Pressure 84 mmHg  HI    Mean Arterial Pressure 99 mmHg    BP Site Left arm    BP Method Manual    Oxygen Saturation 96 %      Measurements from flowsheet : Measurements   1/29/2018 8:02 AM CST Height Measured - Standard 67.5 in    Ht/Wt Measurement Refused by Patient? Yes         Review / Management   Results review:  Lab results   1/29/2018 8:45 AM CST Influenza A POC Negative    Influenza B POC Negative   1/29/2018 8:37 AM CST WBC TR 10.6 x10^3/uL   .    Chest x-ray results   Posterior/anterior, Lateral        Consistent with: looks OK to me, Radiology to Over Read      Impression and Plan   Diagnosis     Asthma (IEC45-JG J45.909).     Cough (DDS41-OW R05).     Orders     Orders (Selected)   Prescriptions  Prescribed  predniSONE 10 mg oral tablet: See Instructions, Instructions: 4 tabs daily for 4 days then 3 tabs daily for 4 days then 2 tabs daily for 4 days then 1 tab daily for 4 days then 1/2 tab daily for 4 days, # 42 tab(s), 0 Refill(s), Type: Maintenance, Pharmacy: [facility] #[id number],....     Orders     F/U in 48-72 hours if not improving, sooner if getting worse.        Professional Services   Counseling  Summary:  This was a 25 minute visit with greater than 50% of that time spent counseling the patient, and coordination of care..    Counseling included differential diagnoses, treatment options, risks and benefits, current condition, and dose adjustments.    The patient was interactive, attentive, asked questions, and verbalized understanding.

## 2022-02-16 NOTE — TELEPHONE ENCOUNTER
---------------------  From: Elidia MILLERRuby (Phone Messages Pool ([id number]_[facility]))   Sent: 10/23/2020 12:07:54 PM CDT  Subject: cough     Phone Message    PCP:   MAYA      Time of Call:  11:46am       Person Calling:  pt  Phone number:  [phone]    Returned call at: 12:01pm    Note:   Pt LM stating she wants a Rx. Pt says she is on day 10 of being positive for covid. Pt says she was starting to feel better but her cough has kicked in. Pt says she has a pretty significant case of asthma and is afraid that going in to the weekend she will need prednisone or something and not be able to get it.    Returned call and told pt she will need an appt. Pt says she has been a pt for 20 years and knows her body. She just wants someone to look at her chart to send in a Rx. Pt says she is afraid that this is going to turn into pneumonia and she does not want to end up in the hospital.     Told pt she has not been seen in over 1 year and she will need an appt to discuss. Transferred to scheduling to make video appt with a provider.    Last office visit and reason:  10/15/19 depression, asthma

## 2022-02-16 NOTE — PROGRESS NOTES
Patient:   HAMILTON MAURICE            MRN: [id number]            FIN: [id number]               Age:   46 years     Sex:  Female     :  1970   Associated Diagnoses:   Pneumonia   Author:   Nelson Manrique PA-C      Visit Information      Date of Service: 2017 04:11 pm  Performing Location: [facility]  Encounter#: [id number]      Primary Care Provider (PCP):  Darrick Cr MD    NPI# [id number]   Visit type:  New symptom.    Source of history:  Self, Medical record.    History limitation:  None.       Chief Complaint   2017 4:14 PM CST    c/o cough x 6 days        History of Present Illness             The patient presents with cough.  The cough is described as hacking.  The severity of the cough is moderate.  The cough is episodic, fluctuates in intensity and is worsening.  The cough has lasted for 6 day(s).  Associated symptoms consist of nasal congestion, denies fever and denies shortness of breath.  CC above noted and confirmed with the patient..        Review of Systems   Eye:  Negative.    Ear/Nose/Mouth/Throat:  Negative except as documented in history of present illness.    Respiratory:  Negative except as documented in history of present illness.             Health Status   Allergies:    Allergic Reactions (All)  Severity Not Documented  Codeine (No reactions were documented)   Medications:  (Selected)   Prescriptions  Prescribed  Advair Diskus 250 mcg-50 mcg inhalation powder: 1 puff(s), INH, BID, # 3 EA, 3 Refill(s), Type: Maintenance, faxed to pharmacy (Rx)  FLUoxetine 40 mg oral capsule: 2 cap(s) ( 80 mg ), po, daily, Instructions: pt will call when this Rx refill is needed, # 180 cap(s), 3 Refill(s), Type: Maintenance, Pharmacy: [facility] #[id number], patient will call when prescription needs to be filled, 2 cap(s) po daily,Instr:pt...  ProAir HFA 90 mcg/inh inhalation aerosol: 2 puff(s), inh, qid, PRN: as needed for wheezing, # 1 EA, 3 Refill(s), Type:  Maintenance, Pharmacy: [facility] #[id number], patient will call when prescription needs to be filled, 2 puff(s) inh qid,PRN:as needed for wheezing  Zithromax 500 mg oral tablet: 1 tab(s) ( 500 mg ), PO, Daily, x 5 day(s), # 5 tab(s), 0 Refill(s), Type: Acute, Pharmacy: [facility] #[id number], 1 tab(s) po daily,x5 day(s)   Problem list:    All Problems  Asthma / ICD-9-.90 / Confirmed  Depression, Recurrent / ICD-9-.30 / Confirmed  Elevated lipids / ICD-9-.4 / Confirmed  Obese / ICD-9-.00 / Probable  Resolved: ASCUS (Atypical Squamous Cells of Undetermined Significance) on Pap Smear / ICD-9-.01  Resolved: Pregnancy / SNOMED CT 445983368  Resolved: Pregnancy / SNOMED CT 220032828  Resolved: TMJ (Temporomandibular Joint Disorder) / ICD-9-.60      Histories   Past Medical History:    Active  Asthma (493.90)  Depression, Recurrent (296.30)  Obese (278.00)  Elevated lipids (272.4)  Resolved  ASCUS (Atypical Squamous Cells of Undetermined Significance) on Pap Smear (795.01): Onset on 2012 at 42 years.  Resolved.  TMJ (Temporomandibular Joint Disorder) (524.60):  Resolved.  Pregnancy (974037510):  Resolved on 3/25/1997 at 26 years.  Pregnancy (331109889):  Resolved on 2001 at 31 years.   Family History:    Heart disease  Father  Cancer  Grandfather (M) ()  Comments:  2012 2:09 PM - Ananya Olson LPN  Lung cancer     Procedure history:    NVD (Normal Vaginal Delivery) (650) on 2001 at 31 Years.  Comments:  2011 10:10 AM - Keisha Nina  Female.  NVD (Normal Vaginal Delivery) (650) on 3/25/1997 at 26 Years.  Comments:  2011 10:10 AM - Keisha Nina  Female.  Cholecystectomy (04631719) on 1996 at 25 Years.  Childbirth (3688594333).  Comments:  2011 8:39 AM - Keisha Nina  G4, P2     Social History:        Alcohol Assessment: Current            1 x per month, 1 drinks/episode average.      Tobacco Assessment: Denies Tobacco Use             Never      Substance Abuse Assessment: Denies Substance Abuse            Never      Employment and Education Assessment            Sales      Home and Environment Assessment            Marital status: Life Partner.  Spouse/Partner name: Lloyd Sandhu.      Nutrition and Health Assessment            Type of diet: Regular.      Exercise and Physical Activity Assessment            Exercise frequency: Daily.  Exercise type: Walking.                     Comments:                      11/02/2011 - Whitney MILLERAnanya                     Does not exercise.      Sexual Assessment            Sexually active: Yes.  Sexual orientation: Heterosexual.  Other contraceptive use: Vasectomy.      Other Assessment            First menses age 12.  Regular menses.  Menstrual duration 7 days.  Cycle interval 28 days.        Physical Examination   Vital Signs   2/27/2017 4:14 PM CST Temperature Temporal 98.6 DegF    Peripheral Pulse Rate 85 bpm    Pulse Site Radial artery    Systolic Blood Pressure 118 mmHg    Diastolic Blood Pressure 74 mmHg    Mean Arterial Pressure 89 mmHg    BP Site Left arm    BP Method Manual    Oxygen Saturation 96 %      Measurements from flowsheet : Measurements   2/27/2017 4:14 PM CST Height Measured - Standard 67.5 in    Weight Measured - Standard 243.6 lb    BSA 2.29 m2    Body Mass Index 37.59 kg/m2      General:  Alert and oriented, No acute distress.    Eye:  Pupils are equal, round and reactive to light, Extraocular movements are intact, Normal conjunctiva.    HENT:  Normocephalic, Oral mucosa is moist, No pharyngeal erythema.    Neck:  Supple, Non-tender, No lymphadenopathy.    Respiratory:  Lungs are clear to auscultation, Respirations are non-labored, Breath sounds are equal.    Cardiovascular:  Normal rate, Regular rhythm, No murmur.    Psychiatric:  Cooperative, Appropriate mood & affect.       Impression and Plan   Diagnosis     Pneumonia (DNF49-SD J18.9).     Patient Instructions:        Counseled: Patient, Regarding diagnosis, Regarding treatment, Regarding medications, Regarding activity, Verbalized understanding.    Orders     Orders (Selected)   Prescriptions  Prescribed  Zithromax 500 mg oral tablet: 1 tab(s) ( 500 mg ), PO, Daily, x 5 day(s), # 5 tab(s), 0 Refill(s), Type: Acute, Pharmacy: [facility] #[id number], 1 tab(s) po daily,x5 day(s).     Take medicine as prescribed, side effects discussed.  Tylenol/ibuprofen for fever and discomfort.  Push fluids.  RTC if not improving in 36-48 hours, prior if concerns as we have discussed.

## 2022-02-16 NOTE — NURSING NOTE
Depression Screening Entered On:  7/7/2021 5:09 PM CDT    Performed On:  7/7/2021 5:08 PM CDT by Cait Yousif CMA               Depression Screening   Little Interest - Pleasure in Activities :   Several days   Feeling Down, Depressed, Hopeless :   Several days   Initial Depression Screen Score :   2 Score   Poor Appetite or Overeating :   Several days   Trouble Falling or Staying Asleep :   Not at all   Feeling Tired or Little Energy :   Several days   Feeling Bad About Yourself :   Several days   Trouble Concentrating :   Several days   Moving or Speaking Slowly :   Not at all   Thoughts Better Off Dead or Hurting Self :   Not at all   Difficulty at Work, Home, Getting Along :   Somewhat difficult   Detailed Depression Screen Score :   4    Total Depression Screen Score :   6    Cait Yousif CMA - 7/7/2021 5:08 PM CDT

## 2022-02-16 NOTE — TELEPHONE ENCOUNTER
Entered by Vida Mitchell CMA on August 26, 2019 11:58:21 AM CDT  ---------------------  From: Vida Mitchell CMA   To: [facility] #[id number]    Sent: 8/26/2019 11:58:21 AM CDT  Subject: Medication Management     ** Submitted: **  Order:FLUoxetine (FLUoxetine 40 mg oral capsule)  2 cap(s)  Oral  daily  Qty:  60 cap(s)        Days Supply:  30        Refills:  0          Substitutions Allowed     Route To [facility] #[id number]    Signed by Vida Mitchell CMA  8/26/2019 11:57:00 AM    ** Submitted: **  Complete:FLUoxetine (FLUoxetine 40 mg oral capsule)   Signed by Vida Mitchell CMA  8/26/2019 11:58:00 AM    ** Not Approved:  **  FLUoxetine (FLUOXETINE 40MG CAPSULES)  TAKE 2 CAPSULES BY MOUTH DAILY  Qty:  60 cap(s)        Days Supply:  30        Refills:  0          Substitutions Allowed     Route To [facility] #[id number]   Signed by Vida Mitchell CMA            ** Patient matched by Vida Mitchell CMA on 8/26/2019 11:47:32 AM CDT **      ------------------------------------------  From: [facility] #[id number]  To: Darrick Cr MD  Sent: August 26, 2019 3:42:05 AM CDT  Subject: Medication Management  Due: August 27, 2019 3:42:05 AM CDT    ** On Hold Pending Signature **  Drug: FLUoxetine (FLUoxetine 40 mg oral capsule)  TAKE 2 CAPSULES BY MOUTH DAILY  Quantity: 60 cap(s)     Days Supply: 30        Refills: 0  Substitutions Allowed  Notes from Pharmacy:     Dispensed Drug: FLUoxetine (FLUoxetine 40 mg oral capsule)  TAKE 2 CAPSULES BY MOUTH DAILY  Quantity: 60 cap(s)     Days Supply: 30        Refills: 0  Substitutions Allowed  Notes from Pharmacy:   ------------------------------------------Date of last office visit and reason:  8/7/18 Px      Date of last Med Check / Px:   8/7/18  Date of last labs pertaining to med:      RTC order in chart:  yes, due now    For Protocol refill, has patient been contacted:  yes called to let patient know that she is due for an appt with  PCP. Filled for 30 days, will call back to schedule.

## 2022-02-16 NOTE — TELEPHONE ENCOUNTER
---------------------  From: Darrick Cr MD   To: LILIA MAURICE    Sent: 7/9/2021 1:13:11 PM CDT  Subject: normal results     Lilia,   Your pap smear was normal. We'll repeat a pap smear in 5 years. Let me know if you have questions.   Tiny Cr

## 2022-02-16 NOTE — NURSING NOTE
Comprehensive Intake Entered On:  10/23/2020 12:58 PM CDT    Performed On:  10/23/2020 12:51 PM CDT by Ramona Smith               Summary   Chief Complaint :   Pt consent to video visit. Pt had pos covid last week. Has been feeling better the past few days but today c/o cough and SOB. Is wondering if she should be on prednisolone. Would also like refills of all of her meds.    Menstrual Status :   Menarcheal   Ramona Smith - 10/23/2020 12:51 PM CDT   Health Status   Allergies Verified? :   Yes   Medication History Verified? :   Yes   Immunizations Current :   Yes   Medical History Verified? :   Yes   Pre-Visit Planning Status :   Completed   Tobacco Use? :   Never smoker   Ramona Smith - 10/23/2020 12:51 PM CDT   Meds / Allergies   (As Of: 10/23/2020 12:58:03 PM CDT)   Allergies (Active)   codeine  Estimated Onset Date:   Unspecified ; Created By:   Keisha Nina; Reaction Status:   Active ; Category:   Drug ; Substance:   codeine ; Type:   Allergy ; Updated By:   Keisha Nina; Reviewed Date:   10/15/2019 4:27 PM CDT        Medication List   (As Of: 10/23/2020 12:58:03 PM CDT)   Prescription/Discharge Order    albuterol  :   albuterol ; Status:   Prescribed ; Ordered As Mnemonic:   ProAir HFA 90 mcg/inh inhalation aerosol ; Simple Display Line:   2 puff(s), inh, qid, PRN: as needed for wheezing, 1 EA, 3 Refill(s) ; Ordering Provider:   Darrick Cr MD; Catalog Code:   albuterol ; Order Dt/Tm:   10/15/2019 6:18:34 PM CDT          FLUoxetine  :   FLUoxetine ; Status:   Prescribed ; Ordered As Mnemonic:   FLUoxetine 40 mg oral capsule ; Simple Display Line:   2 cap(s), Oral, daily, 180 cap(s), 3 Refill(s) ; Ordering Provider:   Darrick Cr MD; Catalog Code:   FLUoxetine ; Order Dt/Tm:   10/15/2019 4:44:52 PM CDT          fluticasone-salmeterol  :   fluticasone-salmeterol ; Status:   Prescribed ; Ordered As Mnemonic:   Advair Diskus 250 mcg-50 mcg inhalation powder ; Simple  Display Line:   1 puff(s), INH, BID, 1 EA, 11 Refill(s) ; Ordering Provider:   Darrick Cr MD; Catalog Code:   fluticasone-salmeterol ; Order Dt/Tm:   10/15/2019 4:45:49 PM CDT          fluticasone-salmeterol  :   fluticasone-salmeterol ; Status:   Prescribed ; Ordered As Mnemonic:   fluticasone-salmeterol 232 mcg-14 mcg/inh inhalation powder ; Simple Display Line:   1 inh, Inhale, bid, 1 EA, 11 Refill(s) ; Ordering Provider:   Darrick Cr MD; Catalog Code:   fluticasone-salmeterol ; Order Dt/Tm:   10/15/2019 4:49:39 PM CDT          fluticasone-salmeterol  :   fluticasone-salmeterol ; Status:   Prescribed ; Ordered As Mnemonic:   fluticasone-salmeterol CFC free 230 mcg-21 mcg/inh inhalation aerosol ; Simple Display Line:   1 puff, Inhale, bid, 1 EA, 11 Refill(s) ; Ordering Provider:   Darrick Cr MD; Catalog Code:   fluticasone-salmeterol ; Order Dt/Tm:   10/15/2019 4:49:42 PM CDT            ID Risk Screen   Recent Travel History :   No recent travel   Family Member Travel History :   No recent travel   Other Exposure to Infectious Disease :   Community exposure to COVID-19 within the last 14 days   COVID-19 Testing Status :   Positive COVID-19 test in the last 30 days   Ramona Smith - 10/23/2020 12:51 PM CDT

## 2022-02-16 NOTE — LETTER
(Inserted Image. Unable to display)   August 07, 2019      HAMILTON MAURICE  [address] [place], [place] [id number]        Dear HAMILTON,      Thank you for selecting [facility] (previously [facility]) for your healthcare needs.     Our records indicate you are due for the following services:     Annual Physical    To schedule an appointment or if you have further questions, please contact your primary clinic:   [facility]          [phone]   [facility]    [phone]             [facility]         [phone]      Powered by [employer]    Sincerely,    Darrick Cr M.D.

## 2022-02-16 NOTE — TELEPHONE ENCOUNTER
---------------------  From: Ruby Brenner LPN (Phone Messages Pool ([id number]_[facility]))   To: Nelson Manrique PA-C;     Sent: 9/10/2021 8:26:14 AM CDT  Subject: prednisone     Phone Message    PCP:   MAYA      Time of Call:  8:05am       Person Calling:  pt  Phone number:  [phone]    Note:   Pt called stating she has the same issue every year. Pt says she can always call and get a 5 day Rx for prednisone to clear up her lungs without being seen.   Pt says she knows she does not  have covid and it is just a chest cold. Pt says she has pressure in her lungs and is coughing the phlegm up but it is just not coming up fast enough.  Pt would like Rx to [facility]    Pt was informed MAYA out of clinic today.  Pt says she does not feel she needs to come in for this since she has it every year and just gets a Rx.  Pt asking for RE[facility]O to send Rx.    Last office visit and reason:  7/7/21 female normal annual exam, med refills---------------------  From: Nelson Manrique PA-C   To: Phone Messages Pool ([id number]_[place] [facility]);     Sent: 9/10/2021 9:57:55 AM CDT  Subject: RE: prednisone     I did talk to her. She already started prednisone and feels improved. I sent in a full course. FU if not improved, or if worse.    KAH

## 2022-02-16 NOTE — NURSING NOTE
Comprehensive Intake Entered On:  10/23/2020 2:12 PM CDT    Performed On:  10/23/2020 2:11 PM CDT by Ramona Smith               Summary   Chief Complaint :   Curbside O2 check after video visit.    Menstrual Status :   Menarcheal   Oxygen Saturation :   96 %   Ramona Smith - 10/23/2020 2:11 PM CDT   ID Risk Screen   Recent Travel History :   No recent travel   Family Member Travel History :   No recent travel   Other Exposure to Infectious Disease :   Community exposure to COVID-19 within the last 14 days   COVID-19 Testing Status :   Positive COVID-19 test in the last 30 days   Ramona Smith - 10/23/2020 2:11 PM CDT

## 2022-02-16 NOTE — PROGRESS NOTES
Patient:   HAMILTON MAURICE            MRN: [id number]            FIN: [id number]               Age:   51 years     Sex:  Female     :  1970   Associated Diagnoses:   Well adult exam; Asthma; Depression, Recurrent   Author:   Darrick Cr MD      Visit Information   Visit type:  Annual exam.    Source of history:  Self.    History limitation:  None.       Chief Complaint   2021 4:00 PM CDT     Physical - Last menses - January, no previous colon screen- no FHX. ,mammo scheduled       Well Adult History   Well Adult History             The patient presents for well adult exam.  The patient's general health status is described as good.  The patient's diet is described as balanced.  Associated symptoms consist of none.  Medical encounters: none.     patient has generally been doing well, has been more stressed and feeling more down, does not think it is depression but thinks it may be hormonal related to perimenopause and general life stress  asthma has been well controlled, ACT reviewed, refills needed      Review of Systems   All other systems reviewed and negative      Health Status   Allergies:    Allergic Reactions (Selected)  Severity Not Documented  Codeine (No reactions were documented)   Medications:  (Selected)   Prescriptions  Prescribed  FLUoxetine 40 mg oral capsule: = 2 cap(s), Oral, daily, # 180 cap(s), 3 Refill(s), Type: Maintenance, Pharmacy: [facility] #[id number], Please put on file-pt will notify you when refill is needed., 2 cap(s) Oral daily,x90 day(s), 67.5, in, 10/15/19 16:25:00 CDT, Height Measure...  ProAir HFA 90 mcg/inh inhalation aerosol: 2 puff(s), inh, qid, PRN: as needed for wheezing, # 1 EA, 3 Refill(s), Type: Maintenance, Pharmacy: [facility] #[id number], hold on file, patient will call when needed  fluticasone-salmeterol 232 mcg-14 mcg/inh inhalation powder: 1 inh, Inhale, bid, # 1 EA, 11 Refill(s), Type: Maintenance, Pharmacy: [facility] #[id number], 1 inh  Inhale bid, 67.5, in, 10/15/19 16:25:00 CDT, Height Measured, 255.6, lb, 10/15/19 16:25:00 CDT, Weight Measured,    Medications          *denotes recorded medication          FLUoxetine 40 mg oral capsule: 2 cap(s), Oral, daily, for 90 day(s), 180 cap(s), 3 Refill(s).          ProAir HFA 90 mcg/inh inhalation aerosol: 2 puff(s), inh, qid, PRN: as needed for wheezing, 1 EA, 3 Refill(s).          fluticasone-salmeterol 232 mcg-14 mcg/inh inhalation powder: 1 inh, Inhale, bid, 1 EA, 11 Refill(s).       Problem list:    All Problems  Asthma / ICD-9-.90 / Confirmed  Depression, Recurrent / ICD-9-.30 / Confirmed  Obese / ICD-9-.00 / Probable  Resolved: ASCUS (Atypical Squamous Cells of Undetermined Significance) on Pap Smear / ICD-9-.01  Resolved: Elevated lipids / ICD-9-.4  Resolved: Pregnancy / SNOMED CT 276496768  Resolved: Pregnancy / SNOMED CT 283889316  Resolved: TMJ (Temporomandibular Joint Disorder) / ICD-9-.60      Histories   Past Medical History:    Active  Asthma (ICD-9-.90)  Depression, Recurrent (ICD-9-.30)  Obese (ICD-9-.00)  Resolved  ASCUS (Atypical Squamous Cells of Undetermined Significance) on Pap Smear (ICD-9-.01): Onset on 2012 at 42 years.  Resolved.  TMJ (Temporomandibular Joint Disorder) (ICD-9-.60):  Resolved.  Elevated lipids (ICD-9-.4):  Resolved.  Pregnancy (SNOMED CT 247188963):  Resolved on 3/25/1997 at 26 years.  Pregnancy (SNOMED CT 922642498):  Resolved on 2001 at 31 years.   Family History:    Heart disease  Father (Riley)  CVA - Cerebrovascular accident  Father (Riley)  Cancer  Grandfather (M) ()  Comments:  2012 2:09 PM JOSE DANIEL - Ananya Olson LPN  Lung cancer     Procedure history:    NVD (Normal Vaginal Delivery) (650) on 2001 at 31 Years.  Comments:  2011 10:10 AM JOSE DANIEL - Keisha Nina  Female.  NVD (Normal Vaginal Delivery) (650) on 3/25/1997 at 26 Years.  Comments:  2011  10:10 AM JOSE DANIEL Nina [place]  Female.  Cholecystectomy ([id number]) on 1/1/1996 at 25 Years.  Childbirth ([id number]).  Comments:  1/19/2011 8:39 AM JOSE DANIEL Nina Keisha  G4, P2     Social History:        Electronic Cigarette/Vaping Assessment            Electronic Cigarette Use: Never.      Alcohol Assessment: Current            1-2 times per week, 1 drinks/episode average.  2 drinks/episode maximum.      Tobacco Assessment: Denies Tobacco Use            Never (less than 100 in lifetime)            Never      Substance Abuse Assessment: Denies Substance Abuse            Never      Employment and Education Assessment            Work/School description: .      Home and Environment Assessment            Marital status: Life Partner.  Spouse/Partner name: Lloyd Sandhu.      Nutrition and Health Assessment            Type of diet: Regular.      Exercise and Physical Activity Assessment: Regular exercise            Exercise frequency: 3-4 times/week.  Exercise type: Walking.                     Comments:                      11/02/2011 - Whitney MILLERAnanya                     Does not exercise.      Sexual Assessment            Sexually active: Yes.  Sexual orientation: Heterosexual.  Other contraceptive use: Vasectomy.      Other Assessment            First menses age 12.  Regular menses.  Menstrual duration 7 days.  Cycle interval 28 days.  History of               abnormal Pap.        Physical Examination   Vital Signs   7/7/2021 4:00 PM CDT Temperature Tympanic 97.1 DegF  LOW    Peripheral Pulse Rate 77 bpm    Systolic Blood Pressure 131 mmHg  HI    Diastolic Blood Pressure 84 mmHg  HI    Mean Arterial Pressure 100 mmHg    Oxygen Saturation 100 %      Measurements from flowsheet : Measurements   7/7/2021 4:00 PM CDT Height Measured - Standard 66.5 in    Weight Measured - Standard 270.6 lb    BSA 2.4 m2    Body Mass Index 43.02 kg/m2  HI      General:  Alert and oriented, No acute distress.    Eye:   Pupils are equal, round and reactive to light, Extraocular movements are intact, Normal conjunctiva.    HENT:  Normocephalic, Tympanic membranes are clear, Oral mucosa is moist, No pharyngeal erythema.    Neck:  Supple, Non-tender, No lymphadenopathy, No thyromegaly.    Respiratory:  Lungs are clear to auscultation.    Cardiovascular:  Normal rate, Regular rhythm.    Breast:  No mass, No tenderness, No discharge.    Gastrointestinal:  Soft, Non-tender, Non-distended, No organomegaly.    Genitourinary:  Normal genitalia for age and sex, No urethral discharge, No lesions.         Perineum: Within normal limits.         Vagina: Within normal limits.         Uterus: Within normal limits.         Ovaries: Within normal limits.         Adnexa: Within normal limits.    Musculoskeletal:  Normal range of motion, Normal strength.    Integumentary:  Warm, Dry, Pink, No rash, no concerning lesions.    Neurologic:  Alert, Oriented, Normal sensory.    Psychiatric:  Cooperative, Appropriate mood & affect.       Review / Management   Results review      Impression and Plan   Diagnosis     Well adult exam (SSI10-DL Z00.00).     Course:  Progressing as expected.    Patient Instructions:       Counseled: Patient, BMI, diet, and exercise.    Diagnosis     Asthma (YYW93-KB J45.909).     Depression, Recurrent (BGO33-RC F33.9).     Course:  stable, declines any adjustments to medications for depression, no concerns about asthma, follow up in one year.    Orders     Orders (Selected)   Prescriptions  Prescribed  FLUoxetine 40 mg oral capsule: = 2 cap(s), Oral, daily, # 180 cap(s), 3 Refill(s), Type: Maintenance, Pharmacy: [facility] #[id number], 2 cap(s) Oral daily,x90 day(s), 66.5, in, 07/07/21 16:00:00 CDT, Height Measured, 270.6, lb, 07/07/21 16:00:00 CDT, Weight Measured  ProAir HFA 90 mcg/inh inhalation aerosol: 2 puff(s), inh, qid, PRN: as needed for wheezing, # 1 EA, 3 Refill(s), Type: Maintenance, Pharmacy: [facility] #[id number], hold on file, 2 puff(s) Inhale qid,PRN:as needed for wheezing, 66.5, in, 07/07/21 16:00:00 CDT, Height Measured, 270.6, lb...  fluticasone-salmeterol 232 mcg-14 mcg/inh inhalation powder: 1 inh, Inhale, bid, # 1 EA, 11 Refill(s), Type: Maintenance, Pharmacy: [facility] #[id number], hold on file, 1 inh Inhale bid, 66.5, in, 07/07/21 16:00:00 CDT, Height Measured, 270.6, lb, 07/07/21 16:00:00 CDT, Weight Measured.

## 2022-02-16 NOTE — NURSING NOTE
Comprehensive Intake Entered On:  10/15/2019 4:29 PM CDT    Performed On:  10/15/2019 4:25 PM CDT by Holly Goodwin CMA               Summary   Chief Complaint :   Depression med check; discuss pneumonia vaccine   Menstrual Status :   Menarcheal   Weight Measured :   255.6 lb(Converted to: 255 lb 10 oz, 115.94 kg)    Height Measured :   67.5 in(Converted to: 5 ft 7 in, 171.45 cm)    Body Mass Index :   39.44 kg/m2 (HI)    Body Surface Area :   2.35 m2   Systolic Blood Pressure :   120 mmHg   Diastolic Blood Pressure :   82 mmHg (HI)    Mean Arterial Pressure :   95 mmHg   Peripheral Pulse Rate :   81 bpm   BP Site :   Right arm   BP Method :   Manual   HR Method :   Electronic   Oxygen Saturation :   98 %   Holly Goodwin CMA - 10/15/2019 4:25 PM CDT   Health Status   Allergies Verified? :   Yes   Medication History Verified? :   Yes   Immunizations Current :   Yes   Medical History Verified? :   Yes   Pre-Visit Planning Status :   Completed   Tobacco Use? :   Never smoker   Holly Goodwin CMA - 10/15/2019 4:25 PM CDT   Consents   Consent for Immunization Exchange :   Consent Granted   Consent for Immunizations to Providers :   Consent Granted   Holly Goodwin CMA - 10/15/2019 4:25 PM CDT   Meds / Allergies   (As Of: 10/15/2019 4:29:45 PM CDT)   Allergies (Active)   codeine  Estimated Onset Date:   Unspecified ; Created By:   Keisha Nina; Reaction Status:   Active ; Category:   Drug ; Substance:   codeine ; Type:   Allergy ; Updated By:   Keisha Nina; Reviewed Date:   10/15/2019 4:27 PM CDT        Medication List   (As Of: 10/15/2019 4:29:45 PM CDT)   Prescription/Discharge Order    albuterol  :   albuterol ; Status:   Prescribed ; Ordered As Mnemonic:   ProAir HFA 90 mcg/inh inhalation aerosol ; Simple Display Line:   2 puff(s), inh, qid, PRN: as needed for wheezing, 1 EA, 3 Refill(s) ; Ordering Provider:   Darrick Cr MD; Catalog Code:   albuterol ; Order Dt/Tm:   8/7/2018 6:57:16 PM CDT          FLUoxetine   :   FLUoxetine ; Status:   Prescribed ; Ordered As Mnemonic:   FLUoxetine 40 mg oral capsule ; Simple Display Line:   2 cap(s), Oral, daily, 60 cap(s), 0 Refill(s) ; Ordering Provider:   Darrick Cr MD; Catalog Code:   FLUoxetine ; Order Dt/Tm:   8/26/2019 11:57:40 AM CDT          fluticasone-salmeterol  :   fluticasone-salmeterol ; Status:   Prescribed ; Ordered As Mnemonic:   Advair Diskus 250 mcg-50 mcg inhalation powder ; Simple Display Line:   1 puff(s), INH, BID, 3 EA, 3 Refill(s) ; Ordering Provider:   Darrick Cr MD; Catalog Code:   fluticasone-salmeterol ; Order Dt/Tm:   8/7/2018 6:57:11 PM CDT

## 2022-02-16 NOTE — TELEPHONE ENCOUNTER
---------------------  From: Filomena Collins PA-C   To: Paola Recio;     Sent: 10/30/2020 11:51:38 AM CDT  Subject: RE: General Message     noted thanks        ---------------------  From: Paola Recio   To: Filomena Collins PA-C;     Sent: 10/30/2020 9:08:54 AM CDT  Subject: General Message       DOS 10/23/2020    [id number] changed to [id number]      Medical Decision Making of a Moderate level for just the Asthma exacerbation.  Also has diagnosis of COVID 19, depression medication refilled.    The level of an E/M should not be lowered because it is a video visit.    Thanks  Paola

## 2022-02-16 NOTE — PROCEDURES
Accession Number:       [id number]  CLINICAL INFORMATION::     None given  LMP::     JANUARY  PREV. PAP::     5/2016  PREV. BX::     NONE GIVEN  SOURCE::     Cervix  STATEMENT OF ADEQUACY::     Satisfactory for evaluation. Endocervical/transformation zone component absent.  INTERPRETATION/RESULT::     Negative for intraepithelial lesion or malignancy.  COMMENT::     This Pap test has been evaluated with computer assisted technology.  CYTOTECHNOLOGIST::     MARITA CONNELLY(ASCP) CT Screening location: [place] [id number]  COMMENT:     See comment       EXPLANATORY NOTE:         The Pap is a screening test for cervical cancer. It is       not a diagnostic test and is subject to false negative       and false positive results. It is most reliable when a       satisfactory sample, regularly obtained, is submitted       with relevant clinical findings and history, and when       the Pap result is evaluated along with historic and       current clinical information.  HPV mRNA E6/E7:     Not Detected       Methodology: Transcription-Mediated Amplification       This assay detects E6/E7 viral messenger RNA (mRNA) from 14       high-risk HPV types (16,18,31,33,35,39,45,51,52,56,58,59,66,68).           The analytical performance characteristics of this       assay have been determined by [employer].       The modifications have not been cleared or approved       by the FDA. This assay has been validated pursuant       to the CLIA regulations and is used for       clinical purposes.         For additional information, please refer to       http://education.[employer]/faq/TOT251l5       (This link if provided for information/       educational purposes only.)

## 2022-02-16 NOTE — PROGRESS NOTES
Patient:   HAMILTON MAURICE            MRN: [id number]            FIN: [id number]               Age:   47 years     Sex:  Female     :  1970   Associated Diagnoses:   Depression, Recurrent; Well adult exam   Author:   Darrick Cr MD      Visit Information   Visit type:  Annual exam.    Source of history:  Self.    History limitation:  None.       Chief Complaint   2017 4:42 PM CDT    Annual px      Well Adult History   Well Adult History             The patient presents for well adult exam.  The patient's general health status is described as good.  No concerns at this time. Notes that she has been more lazaro with periods. .  The patient's diet is described as balanced.  Associated symptoms consist of none.  Medical encounters: none.     No issues with asthma. Has been well controlled. Has not used albueterol for several years. Had run out of Advair.  Fluoxetine does seem to help      Review of Systems   Constitutional:  Negative.    Eye:  Negative.    Ear/Nose/Mouth/Throat:  Negative.    Respiratory:  Negative except as documented in history of present illness.    Cardiovascular:  Negative.    Breast:  Negative.    All other systems reviewed and negative      Health Status   Allergies:    Allergic Reactions (Selected)  Severity Not Documented  Codeine (No reactions were documented)   Medications:  (Selected)   Prescriptions  Prescribed  Advair Diskus 250 mcg-50 mcg inhalation powder: 1 puff(s), INH, BID, # 3 EA, 3 Refill(s), Type: Maintenance, faxed to pharmacy (Rx)  FLUoxetine 40 mg oral capsule: 2 cap(s) ( 80 mg ), po, daily, Instructions: pt will call when this Rx refill is needed, # 180 cap(s), 3 Refill(s), Type: Maintenance, Pharmacy: [facility] #[id number], patient will call when prescription needs to be filled, 2 cap(s) po daily,Instr:pt...  ProAir HFA 90 mcg/inh inhalation aerosol: 2 puff(s), inh, qid, PRN: as needed for wheezing, # 1 EA, 3 Refill(s), Type: Maintenance, Pharmacy: [facility]  #[id number], patient will call when prescription needs to be filled, 2 puff(s) inh qid,PRN:as needed for wheezing   Problem list:    All Problems  Asthma / ICD-9-.90 / Confirmed  Depression, Recurrent / ICD-9-.30 / Confirmed  Elevated lipids / ICD-9-.4 / Confirmed  Obese / ICD-9-.00 / Probable  Resolved: ASCUS (Atypical Squamous Cells of Undetermined Significance) on Pap Smear / ICD-9-.01  Resolved: Pregnancy / SNOMED CT 759241656  Resolved: Pregnancy / SNOMED CT 249340506  Resolved: TMJ (Temporomandibular Joint Disorder) / ICD-9-.60      Histories   Past Medical History:    Active  Asthma (ICD-9-.90)  Depression, Recurrent (ICD-9-.30)  Obese (ICD-9-.00)  Elevated lipids (ICD-9-.4)  Resolved  ASCUS (Atypical Squamous Cells of Undetermined Significance) on Pap Smear (ICD-9-.01): Onset on 2012 at 42 years.  Resolved.  TMJ (Temporomandibular Joint Disorder) (ICD-9-.60):  Resolved.  Pregnancy (SNOMED CT 418730768):  Resolved on 3/25/1997 at 26 years.  Pregnancy (SNOMED CT 406959320):  Resolved on 2001 at 31 years.   Family History:    Heart disease  Father  Cancer  Grandfather (M) ()  Comments:  2012 2:09 PM - Ananya Olson LPN  Lung cancer     Procedure history:    NVD (Normal Vaginal Delivery) (650) on 2001 at 31 Years.  Comments:  2011 10:10 AM - Keisha Nina  Female.  NVD (Normal Vaginal Delivery) (650) on 3/25/1997 at 26 Years.  Comments:  2011 10:10 AM - Keisha Nina  Female.  Cholecystectomy ([id number]) on 1996 at 25 Years.  Childbirth ([id number]).  Comments:  2011 8:39 AM - Keisha Nina  G4, P2     Social History:        Alcohol Assessment: Current            1 x per month, 1 drinks/episode average.      Tobacco Assessment: Denies Tobacco Use            Never      Substance Abuse Assessment: Denies Substance Abuse            Never      Employment and Education Assessment            Sales       Home and Environment Assessment            Marital status: Life Partner.  Spouse/Partner name: Lloyd Sandhu.      Nutrition and Health Assessment            Type of diet: Regular.      Exercise and Physical Activity Assessment            Exercise frequency: Daily.  Exercise type: Walking.                     Comments:                      11/02/2011 - Whitney MILLERAnanya                     Does not exercise.      Sexual Assessment            Sexually active: Yes.  Sexual orientation: Heterosexual.  Other contraceptive use: Vasectomy.      Other Assessment            First menses age 12.  Regular menses.  Menstrual duration 7 days.  Cycle interval 28 days.        Physical Examination   Vital Signs   6/20/2017 4:42 PM CDT Temperature Temporal 98.5 DegF    Peripheral Pulse Rate 80 bpm    Pulse Site Radial artery    HR Method Manual    Systolic Blood Pressure 122 mmHg    Diastolic Blood Pressure 68 mmHg    Mean Arterial Pressure 86 mmHg    BP Site Right arm    BP Method Manual      Measurements from flowsheet : Measurements   6/20/2017 4:42 PM CDT Height Measured - Standard 67.5 in    Weight Measured - Standard 237.6 lb    BSA 2.26 m2    Body Mass Index 36.66 kg/m2      General:  Alert and oriented, No acute distress.    Eye:  Pupils are equal, round and reactive to light, Extraocular movements are intact, Normal conjunctiva.    HENT:  Normocephalic, Tympanic membranes are clear, Oral mucosa is moist, No pharyngeal erythema.    Neck:  Supple, Non-tender, No lymphadenopathy, No thyromegaly.    Respiratory:  Lungs are clear to auscultation.    Cardiovascular:  Normal rate, Regular rhythm.    Breast:  declined.    Gastrointestinal:  Soft, Non-tender, Non-distended, No organomegaly.    Musculoskeletal:  Normal range of motion, Normal strength.    Integumentary:  Warm, Dry, Pink, No rash, no concerning lesions.    Neurologic:  Alert, Oriented, Normal sensory.    Psychiatric:  Cooperative, Appropriate mood & affect.        Impression and Plan   Diagnosis     Depression, Recurrent (VNA65-VG F33.9).     Well adult exam (WDY05-XI Z00.00).     Course:  Progressing as expected.    Patient Instructions:       Counseled: Patient, BMI, diet, and exercise.

## 2022-02-16 NOTE — TELEPHONE ENCOUNTER
---------------------  From: Ramona Smith (Phone Messages Pool ([id number]_[place]))   To: MAYA Message Pool ([id number]_[facility]);     Sent: 10/27/2020 9:25:18 AM CDT  Subject: FW: Med refill conversation     BAM oc until tomorrow.         ---------------------  From: HAMILTON MAURICE  To: [facility]  Sent: 10/27/2020 08:54 a.m. CDT  Subject: Med refill conversation  Good morning...    I had a great virtual appointment this past Friday to get me through some scary moments relevant to finishing (hopefully) up with Covid and in combination, asthma. As with every year, I tend to have some lung issues and having Covid did not help that.    Filomena got me going on a five-day supply of prednisone which absolutely kicked my lungs in the right direction. I have one more dose to take this evening and I m fearful (knowing my body and history) that maybe 5 days wasn t quite enough to kick this thing to the curb. The coughing and productiveness has gotten better but not great. In past history, we ve done a hard core dose of prednisone with tappering. I m just wondering if it s reasonable to get a refill on the prednisone in that fashion? Currently I m at 20mg twice daily. Tonight s dose is my last and I m a little nervous about going backwards. I am using my inhalers religiously and that is helping as well.    Another question/favor...Filomena called in a prescription of Advair for me. I did not pick that up last week as it has a $500 copay and I did have some Fluticasone at home. Can the prescription be changed to the Fluticasone Propionate and Salmeterol Inhalation Powder? This works just as well as Advair at a much lessor cost.    Finally, I do need my annual Fluoxetine refilled. I m good for the next month so it isn t urgent. Things are going well at the current dosage. Please advise if I need a further appointment on this.    Thanks so much everyone for all that you do! I am so grateful for  all of the help [employer] has given me always, especially these last few weeks.    Lilia Burnham  [birth year]  [phone] advise,    She is due for her annual med check (last done 10/2019) in regards to Fluoxetine refill.---------------------  From: Mitra Milner CMA ([employer] ([id number])   To: Darrick Cr MD;     Sent: 10/27/2020 10:16:41 AM CDT  Subject: FW: Med refill conversationGiven recent visit, can you please call her and run through a PHQ-9. If controlled, OK for fluoxetine refill for a year. Recommend an annual preventive exam in the spring when she will be due for 5 year pap smear and to discuss colon cancer screening---------------------  From: Darrick Cr MD   To: [employer] ([id number]_[facility]);     Sent: 10/27/2020 10:42:52 AM CDT  Subject: RE: Med refill conversationok, do you want to extend Prednisone rx and send in for alt for Advair--( Fluticasone Propionate and Salmeterol Inhalation Powder)?---------------------  From: Mitra Milner CMA ([employer] ([id number]_[facility]))   To: Darrick Cr MD;     Sent: 10/27/2020 1:18:15 PM CDT  Subject: RE: Med refill conversation  ** Submitted: **  Order:predniSONE (predniSONE 10 mg oral tablet)  1 tab(s)  Oral  daily  30mg x 4 days, then 20mg x 4 days,then 10 mg x 4 days  Qty:  24 tab(s)        Refills:  0          Substitutions Allowed     Route To Pharmacy - [facility] #[id number]    Signed by Darrick Cr MD  10/27/2020 7:10:00 PM [facility]    ** Submitted: **  Order:fluticasone-salmeterol (fluticasone-salmeterol 232 mcg-14 mcg/inh inhalation powder)  1 inh  Inhale  bid  Qty:  1 EA        Refills:  11          Substitutions Allowed     Route To Pharmacy - [facility] #[id number]    Signed by Darrick Cr MD  10/27/2020 7:10:00 PM [facility]---------------------  From: Darrick Cr MD   To: [facility] ([id number]_[facility]);     Sent: 10/27/2020 2:13:29 PM CDT  Subject: RE: Med refill  conversation     Ok as noted below - I think that is the fluticasone salmeterol powder she was referencingSpoke to Lilia-PHQ-9 filled out/score: 0. I filled Fluoxetine x 1 yr. RTC in place for px in the Spring. Pt to call if she has any further questions.

## 2022-04-03 ENCOUNTER — HEALTH MAINTENANCE LETTER (OUTPATIENT)
Age: 52
End: 2022-04-03

## 2022-04-20 ENCOUNTER — OFFICE VISIT (OUTPATIENT)
Dept: FAMILY MEDICINE | Facility: CLINIC | Age: 52
End: 2022-04-20
Payer: COMMERCIAL

## 2022-04-20 VITALS
SYSTOLIC BLOOD PRESSURE: 130 MMHG | WEIGHT: 269.5 LBS | TEMPERATURE: 97.1 F | HEART RATE: 80 BPM | DIASTOLIC BLOOD PRESSURE: 82 MMHG | BODY MASS INDEX: 42.85 KG/M2 | OXYGEN SATURATION: 93 %

## 2022-04-20 DIAGNOSIS — J45.41 MODERATE PERSISTENT ASTHMA WITH ACUTE EXACERBATION: Primary | ICD-10-CM

## 2022-04-20 DIAGNOSIS — Z12.11 SCREEN FOR COLON CANCER: ICD-10-CM

## 2022-04-20 DIAGNOSIS — E66.01 MORBID OBESITY (H): ICD-10-CM

## 2022-04-20 DIAGNOSIS — F33.9 RECURRENT DEPRESSIVE DISORDER (H): ICD-10-CM

## 2022-04-20 PROBLEM — J45.909 ASTHMA: Status: ACTIVE | Noted: 2022-04-20

## 2022-04-20 PROBLEM — E66.9 OBESITY: Status: ACTIVE | Noted: 2022-04-20

## 2022-04-20 PROCEDURE — 99214 OFFICE O/P EST MOD 30 MIN: CPT | Performed by: FAMILY MEDICINE

## 2022-04-20 RX ORDER — FLUOXETINE 40 MG/1
20 CAPSULE ORAL DAILY
COMMUNITY
Start: 2021-07-07 | End: 2022-04-20

## 2022-04-20 RX ORDER — FLUTICASONE PROPIONATE AND SALMETEROL XINAFOATE 230; 21 UG/1; UG/1
2 AEROSOL, METERED RESPIRATORY (INHALATION) 2 TIMES DAILY
COMMUNITY
End: 2022-11-09

## 2022-04-20 RX ORDER — FLUTICASONE PROPIONATE AND SALMETEROL 232; 14 UG/1; UG/1
1 POWDER, METERED RESPIRATORY (INHALATION) 2 TIMES DAILY
Qty: 3 EACH | Refills: 4 | Status: SHIPPED | OUTPATIENT
Start: 2022-04-20 | End: 2023-06-26

## 2022-04-20 RX ORDER — AZITHROMYCIN 250 MG/1
TABLET, FILM COATED ORAL
Qty: 6 TABLET | Refills: 0 | Status: SHIPPED | OUTPATIENT
Start: 2022-04-20 | End: 2022-04-25

## 2022-04-20 RX ORDER — FLUOXETINE 40 MG/1
40 CAPSULE ORAL 2 TIMES DAILY
Qty: 180 CAPSULE | Refills: 3 | Status: SHIPPED | OUTPATIENT
Start: 2022-04-20 | End: 2023-05-17

## 2022-04-20 RX ORDER — ALBUTEROL SULFATE 90 UG/1
2 AEROSOL, METERED RESPIRATORY (INHALATION) 4 TIMES DAILY PRN
COMMUNITY
Start: 2021-07-07 | End: 2023-09-07

## 2022-04-20 RX ORDER — FLUTICASONE PROPIONATE AND SALMETEROL 250; 50 UG/1; UG/1
2 POWDER RESPIRATORY (INHALATION) 2 TIMES DAILY
COMMUNITY
Start: 2021-09-17 | End: 2022-04-20

## 2022-04-20 RX ORDER — PREDNISONE 10 MG/1
TABLET ORAL
Qty: 40 TABLET | Refills: 0 | Status: SHIPPED | OUTPATIENT
Start: 2022-04-20 | End: 2022-05-06

## 2022-04-20 NOTE — PROGRESS NOTES
"  Assessment & Plan      Diagnosis Comments   1. Moderate persistent asthma with acute exacerbation  predniSONE (DELTASONE) 10 MG tablet, azithromycin (ZITHROMAX) 250 MG tablet, fluticasone-salmeterol (AIRDUO RESPICLICK) 232-14 MCG/ACT inhaler   Noted acute exacerbation at this time.  Medications as prescribed above.  Will try prescription of air duo but discussed with patient that there may be a different one that is preferred and less expensive.  Will see if pharmacy cannot access with request for change   2. Morbid obesity (H)   overall stable, continues to work at healthy diet and exercise   3. Recurrent depressive disorder (H)  FLUoxetine (PROZAC) 40 MG capsule  Well-controlled on current regimen.  Medication is refilled as previously ordered.  Patient was under the impression she was taking a total of 40 mg a day but looking back in prescriptions it has been 80 mg a day.  Could consider cutting back if she would like.  Follow-up again in 1 year   4. Screen for colon cancer  Fecal colorectal cancer screen (FIT)   Generally preventive services are up-to-date except colon cancer screening.  Will have her do fit test.  Follow-up as needed                    BMI:   Estimated body mass index is 42.85 kg/m  as calculated from the following:    Height as of 7/7/21: 1.689 m (5' 6.5\").    Weight as of this encounter: 122.2 kg (269 lb 8 oz).   Weight management plan: Discussed healthy diet and exercise guidelines        Return in about 1 week (around 4/27/2022), or if symptoms worsen or fail to improve.   Otherwise, annual exam due in one year.     Darrick Cr MD  [facility]    Monica Rodrigues is a 51 year old who presents for the following health issues     URI    History of Present Illness     Asthma:  She presents for follow up of asthma.  She has some cough, some wheezing, and some shortness of breath. She is using a relief medication 2-3 times per day. She does not miss any " doses of her controller medication throughout the week.Patient is aware of the following triggers: upper respiratory infections. The patient has not had a visit to the Emergency Room, Urgent Care or Hospital due to asthma since the last clinic visit.     She eats 2-3 servings of fruits and vegetables daily.She consumes 0 sweetened beverage(s) daily.She exercises with enough effort to increase her heart rate 10 to 19 minutes per day.  She exercises with enough effort to increase her heart rate 3 or less days per week.   She is taking medications regularly.     She would also like to follow-up on her depression.  Current regimen is working well.  She notes that she changed jobs and symptoms got significantly better.  Patient is currently taking 80 mg a day.  She thought that she was taking a total of 40 mg a day.  She will continue current regimen at this time.  Preventive services are generally up-to-date but she is due for colon cancer screening.  Will take home fit test today          Review of Systems         Objective    /82   Pulse 80   Temp 97.1  F (36.2  C)   Wt 122.2 kg (269 lb 8 oz)   SpO2 93%   BMI 42.85 kg/m    Body mass index is 42.85 kg/m .  Physical Exam   GENERAL: healthy, alert and no distress  EYES: Eyes grossly normal to inspection, PERRL and conjunctivae and sclerae normal  HENT: ear canals and TM's normal, nose and mouth without ulcers or lesions  NECK: no adenopathy, no asymmetry, masses, or scars and thyroid normal to palpation  RESP: no rales, rhonchi or wheezes, no rales , no rhonchi and expiratory wheezes throughout  CV: regular rate and rhythm, normal S1 S2, no S3 or S4, no murmur, click or rub, no peripheral edema and peripheral pulses strong

## 2022-04-27 ENCOUNTER — TELEPHONE (OUTPATIENT)
Dept: FAMILY MEDICINE | Facility: CLINIC | Age: 52
End: 2022-04-27
Payer: COMMERCIAL

## 2022-04-27 DIAGNOSIS — J45.41 MODERATE PERSISTENT ASTHMA WITH ACUTE EXACERBATION: Primary | ICD-10-CM

## 2022-04-27 RX ORDER — FLUTICASONE PROPIONATE AND SALMETEROL 250; 50 UG/1; UG/1
1 POWDER RESPIRATORY (INHALATION) EVERY 12 HOURS
Qty: 1 EACH | Refills: 11 | Status: SHIPPED | OUTPATIENT
Start: 2022-04-27 | End: 2022-11-09

## 2022-04-27 NOTE — TELEPHONE ENCOUNTER
OK to substitute but I'm not sure which version of fluticasone-salmeterol they mean. Please check with [employer] whether it is the diskus or the inhaler

## 2022-04-27 NOTE — TELEPHONE ENCOUNTER
Spoke to Jono and says that it didn't say specifically say in the rejection.  She is recommending that you try the Advair diskus - since the insurance usually favors the diskus over the inhaler

## 2022-04-27 NOTE — TELEPHONE ENCOUNTER
Received fax from [employer] stating that Airduo   Respiclick 232/14MCG INH is not covered on pt's plan and the alternative is Fluticasone-Salmeterol. Please advise if appropriate.

## 2022-04-28 ENCOUNTER — TELEPHONE (OUTPATIENT)
Dept: FAMILY MEDICINE | Facility: CLINIC | Age: 52
End: 2022-04-28
Payer: COMMERCIAL

## 2022-04-28 NOTE — TELEPHONE ENCOUNTER
Tc with pt, who stated she is feeling better, but the wheezing is not getting better. Stated she is on a prednisone taper; took 30mg today, and will start the 20mg tomorrow. Pt stated she is using albuterol 'not too often as it is not helping'. Pt expressed worry about the weekend coming up and would like to be seen. Pt was transferred to scheduling for an appt.

## 2022-04-28 NOTE — TELEPHONE ENCOUNTER
Reason for Call: Question    Detailed comments: Patient states she is feeling better but her wheezing is not getting better. Patient states her medication is running low and is done with the Zpack and is wondering what she should do.     Phone Number Patient can be reached at: Cell number on file:    Telephone Information:   Mobile [phone]       Best Time: anytime    Can we leave a detailed message on this number? YES    Call taken on 4/28/2022 at 1:09 PM by Natalee Coats

## 2022-04-29 ENCOUNTER — TELEPHONE (OUTPATIENT)
Dept: FAMILY MEDICINE | Facility: CLINIC | Age: 52
End: 2022-04-29

## 2022-04-29 ENCOUNTER — OFFICE VISIT (OUTPATIENT)
Dept: FAMILY MEDICINE | Facility: CLINIC | Age: 52
End: 2022-04-29
Payer: COMMERCIAL

## 2022-04-29 VITALS — DIASTOLIC BLOOD PRESSURE: 80 MMHG | OXYGEN SATURATION: 98 % | HEART RATE: 91 BPM | SYSTOLIC BLOOD PRESSURE: 124 MMHG

## 2022-04-29 DIAGNOSIS — J45.31 MILD PERSISTENT ASTHMA WITH ACUTE EXACERBATION: Primary | ICD-10-CM

## 2022-04-29 PROCEDURE — 99213 OFFICE O/P EST LOW 20 MIN: CPT | Performed by: PHYSICIAN ASSISTANT

## 2022-04-29 RX ORDER — CEFDINIR 300 MG/1
300 CAPSULE ORAL 2 TIMES DAILY
Qty: 20 CAPSULE | Refills: 0 | Status: SHIPPED | OUTPATIENT
Start: 2022-04-29 | End: 2022-11-09

## 2022-04-29 RX ORDER — PREDNISONE 10 MG/1
TABLET ORAL
Qty: 24 TABLET | Refills: 0 | Status: SHIPPED | OUTPATIENT
Start: 2022-04-29 | End: 2022-11-09

## 2022-04-29 ASSESSMENT — ASTHMA QUESTIONNAIRES
QUESTION_2 LAST FOUR WEEKS HOW OFTEN HAVE YOU HAD SHORTNESS OF BREATH: MORE THAN ONCE A DAY
QUESTION_1 LAST FOUR WEEKS HOW MUCH OF THE TIME DID YOUR ASTHMA KEEP YOU FROM GETTING AS MUCH DONE AT WORK, SCHOOL OR AT HOME: A LITTLE OF THE TIME
ACT_TOTALSCORE: 11
QUESTION_3 LAST FOUR WEEKS HOW OFTEN DID YOUR ASTHMA SYMPTOMS (WHEEZING, COUGHING, SHORTNESS OF BREATH, CHEST TIGHTNESS OR PAIN) WAKE YOU UP AT NIGHT OR EARLIER THAN USUAL IN THE MORNING: FOUR OR MORE NIGHTS A WEEK
QUESTION_5 LAST FOUR WEEKS HOW WOULD YOU RATE YOUR ASTHMA CONTROL: SOMEWHAT CONTROLLED
ACT_TOTALSCORE: 11
QUESTION_4 LAST FOUR WEEKS HOW OFTEN HAVE YOU USED YOUR RESCUE INHALER OR NEBULIZER MEDICATION (SUCH AS ALBUTEROL): ONE OR TWO TIMES PER DAY

## 2022-04-29 ASSESSMENT — ENCOUNTER SYMPTOMS
SHORTNESS OF BREATH: 0
EYES NEGATIVE: 1
COUGH: 1
CARDIOVASCULAR NEGATIVE: 1
WHEEZING: 1
CONSTITUTIONAL NEGATIVE: 1
CHEST TIGHTNESS: 1

## 2022-04-29 NOTE — PROGRESS NOTES
Assessment & Plan     Mild persistent asthma with acute exacerbationMild improvement  - cefdinir (OMNICEF) 300 MG capsule  Dispense: 20 capsule; Refill: 0  - predniSONE (DELTASONE) 10 MG tablet  Dispense: 24 tablet; Refill: 0    Start her burst and taper over. Follow-up PRN as we discussed.               No follow-ups on file.    NESTOR Johnson  [facility]    Monica Rodrigues is a 51 year old who presents for the following health issues     51-year-old female with mild to moderate persistent asthma presents to follow-up for recent flare  She was in on the 20th for a flare of her asthma and given a burst and taper of prednisone and Zithromax she is now finished with the Zithromax is down to 20 mg daily of prednisone she still has a cough nonproductive no fever no chills mild tightness in the chest no significant shortness of breath  No nasal congestion no sore throat  She has not had fever    History of Present Illness     Asthma:  She presents for follow up of asthma.  She has some cough, some wheezing, and some shortness of breath. She is using a relief medication 2-3 times per day. She does not miss any doses of her controller medication throughout the week.Patient is aware of the following triggers: upper respiratory infections. The patient has not had a visit to the Emergency Room, Urgent Care or Hospital due to asthma since the last clinic visit.     She eats 2-3 servings of fruits and vegetables daily.She consumes 0 sweetened beverage(s) daily.She exercises with enough effort to increase her heart rate 10 to 19 minutes per day.  She exercises with enough effort to increase her heart rate 3 or less days per week.   She is taking medications regularly.             Review of Systems   Constitutional: Negative.    HENT: Negative.    Eyes: Negative.    Respiratory: Positive for cough, chest tightness and wheezing. Negative for shortness of breath.    Cardiovascular: Negative.              Objective    /80 (BP Location: Right arm, Patient Position: Sitting)   Pulse 91   SpO2 98%   There is no height or weight on file to calculate BMI.  Physical Exam  Vitals and nursing note reviewed.   HENT:      Head: Normocephalic and atraumatic.      Right Ear: Tympanic membrane normal.      Left Ear: Tympanic membrane normal.      Nose: Nose normal.      Mouth/Throat:      Mouth: Mucous membranes are moist.   Eyes:      Conjunctiva/sclera: Conjunctivae normal.   Cardiovascular:      Rate and Rhythm: Regular rhythm.      Heart sounds: Normal heart sounds.   Pulmonary:      Effort: Pulmonary effort is normal. No respiratory distress.      Breath sounds: No stridor. Wheezing and rhonchi present.   Musculoskeletal:      Cervical back: Normal range of motion and neck supple.   Lymphadenopathy:      Cervical: No cervical adenopathy.   Neurological:      Mental Status: She is alert.

## 2022-05-03 ENCOUNTER — CARE COORDINATION (OUTPATIENT)
Dept: FAMILY MEDICINE | Facility: CLINIC | Age: 52
End: 2022-05-03
Payer: COMMERCIAL

## 2022-07-22 RX ORDER — FLUOXETINE 40 MG/1
40 CAPSULE ORAL DAILY
Qty: 180 CAPSULE | Refills: 0 | OUTPATIENT
Start: 2022-07-22

## 2022-07-22 NOTE — TELEPHONE ENCOUNTER
"Last Written Prescription Date:  4/20/22  Last Fill Quantity: 180,  # refills: 3   Last office visit: 4/29/2022  Future Office Visit:              Requested Prescriptions   Pending Prescriptions Disp Refills     FLUoxetine (PROZAC) 40 MG capsule 180 capsule 0     Sig: Take 1 capsule (40 mg) by mouth daily       SSRIs Protocol Passed - 7/22/2022  2:54 PM        Passed - Recent (12 mo) or future (30 days) visit within the authorizing provider's specialty     Patient has had an office visit with the authorizing provider or a provider within the authorizing providers department within the previous 12 mos or has a future within next 30 days. See \"Patient Info\" tab in inbasket, or \"Choose Columns\" in Meds & Orders section of the refill encounter.              Passed - Medication is active on med list        Passed - Patient is age 18 or older        Passed - No active pregnancy on record        Passed - No positive pregnancy test in last 12 months             JOJO HAYDEN RN 07/22/22 3:25 PM    "

## 2022-10-03 ENCOUNTER — HEALTH MAINTENANCE LETTER (OUTPATIENT)
Age: 52
End: 2022-10-03

## 2022-11-09 ENCOUNTER — OFFICE VISIT (OUTPATIENT)
Dept: FAMILY MEDICINE | Facility: CLINIC | Age: 52
End: 2022-11-09
Payer: COMMERCIAL

## 2022-11-09 VITALS
HEART RATE: 80 BPM | TEMPERATURE: 97 F | OXYGEN SATURATION: 95 % | BODY MASS INDEX: 36.41 KG/M2 | WEIGHT: 232 LBS | SYSTOLIC BLOOD PRESSURE: 118 MMHG | HEIGHT: 67 IN | RESPIRATION RATE: 20 BRPM | DIASTOLIC BLOOD PRESSURE: 74 MMHG

## 2022-11-09 DIAGNOSIS — J02.9 PHARYNGITIS, UNSPECIFIED ETIOLOGY: ICD-10-CM

## 2022-11-09 DIAGNOSIS — J45.31 MILD PERSISTENT ASTHMA WITH ACUTE EXACERBATION: ICD-10-CM

## 2022-11-09 DIAGNOSIS — Z23 NEED FOR VACCINATION: ICD-10-CM

## 2022-11-09 DIAGNOSIS — J06.9 UPPER RESPIRATORY TRACT INFECTION, UNSPECIFIED TYPE: Primary | ICD-10-CM

## 2022-11-09 LAB
DEPRECATED S PYO AG THROAT QL EIA: NEGATIVE
GROUP A STREP BY PCR: NOT DETECTED

## 2022-11-09 PROCEDURE — 90682 RIV4 VACC RECOMBINANT DNA IM: CPT | Performed by: PHYSICIAN ASSISTANT

## 2022-11-09 PROCEDURE — 87651 STREP A DNA AMP PROBE: CPT | Performed by: PHYSICIAN ASSISTANT

## 2022-11-09 PROCEDURE — 90471 IMMUNIZATION ADMIN: CPT | Performed by: PHYSICIAN ASSISTANT

## 2022-11-09 PROCEDURE — 99214 OFFICE O/P EST MOD 30 MIN: CPT | Mod: 25 | Performed by: PHYSICIAN ASSISTANT

## 2022-11-09 RX ORDER — ALBUTEROL SULFATE 90 UG/1
2 AEROSOL, METERED RESPIRATORY (INHALATION) EVERY 6 HOURS
Qty: 18 G | Refills: 3 | Status: SHIPPED | OUTPATIENT
Start: 2022-11-09 | End: 2023-09-07

## 2022-11-09 RX ORDER — PREDNISONE 20 MG/1
40 TABLET ORAL DAILY
Qty: 10 TABLET | Refills: 0 | Status: SHIPPED | OUTPATIENT
Start: 2022-11-09 | End: 2022-11-14

## 2022-11-09 NOTE — PROGRESS NOTES
Assessment & Plan     Upper respiratory tract infection, unspecified type  Likely viral.  Her initial sx first 2 weeks primarily ear congestion, now with increased cough, wheezing, congestion, rhinorrhea.  NO signs of sinusitis on exam today but I discussed if sx persist 2 weeks despite use of the prednisone consider abx.  She can call or do an evisit if persistent.  Prednisone should be helpful for her asthma exacerbation.    Continue fluids, rest and ibuprofen or tylenol for comfort.      - albuterol (PROAIR HFA/PROVENTIL HFA/VENTOLIN HFA) 108 (90 Base) MCG/ACT inhaler  Dispense: 18 g; Refill: 3    Pharyngitis    - Streptococcus A Rapid Screen w/Reflex to PCR - Clinic Collect  - Group A Streptococcus PCR Throat Swab    Mild persistent asthma with acute exacerbation  Short course of oral steroids.   Push fluids, rest and ibuprofen or tylenol for comfort.    Refilled abluterol. Should use every 4-6 hours as needed.    - predniSONE (DELTASONE) 20 MG tablet  Dispense: 10 tablet; Refill: 0    Need for vaccination    - INFLUENZA QUAD, PF (RIV4) (FLUBLOK)      Return for Follow up with primary care provider in 1-2 weeks.    MONALISA [facility]    Monica Rodrigues is a 52 year old female who presents to clinic today for the following health issues:  Chief Complaint   Patient presents with     Ear Problem     Bilateral ear pain,cough,SOB and sore throat x month     History of Present Illness       Reason for visit:  Long term ear pain and congestion  Symptom onset:  3-4 weeks ago  Symptoms include:  Ear pain, sore throat, congestion and now wheezing  Symptom intensity:  Severe  Symptom progression:  Staying the same  Had these symptoms before:  Yes  Has tried/received treatment for these symptoms:  Yes  Previous treatment was successful:  Yes  Prior treatment description:  Prednisone for lungs  What makes it worse:  No  What makes it better:  NyQuil for nose and ears    She  "eats 4 or more servings of fruits and vegetables daily.She consumes 0 sweetened beverage(s) daily.She exercises with enough effort to increase her heart rate 10 to 19 minutes per day.  She exercises with enough effort to increase her heart rate 3 or less days per week.   She is taking medications regularly.  started in the ears, full.  Followed by some ST, over the last week developed cough, congestion in the chest, no sigificant rhinorrhea, no facial pain or pressure.     Little pressure in the chest.    No sob, difficulty breathing at rest but does with activity and cough spells.   Using albuterol this morning.    Cough: some production.    No vomiting, diarrhea.   No fevers.    No chest pain.    HA.  Congested.        Review of Systems  Constitutional, HEENT, cardiovascular, pulmonary, gi and gu systems are negative, except as otherwise noted.      Objective    /74 (BP Location: Right arm, Patient Position: Sitting, Cuff Size: Adult Large)   Pulse 80   Temp 97  F (36.1  C) (Tympanic)   Resp 20   Ht 1.689 m (5' 6.5\")   Wt 105.2 kg (232 lb)   SpO2 95%   BMI 36.88 kg/m    Physical Exam   Pt is in no acute distress and appears well  Ears patent B:  TM s intact, non-injected. All land marks easily visibile    Nasal mucosa is non-edematous, no discharge.    Pharynx: non erythematous, tonsils non hypertrophied, No exudate.\, clear drainage in posterior pharynx.     Neck supple: small tender adenopathy    Lungs: CTA but moderate bronchospasm with deep breathing.    Heart: RRR, no murmur, no thrills or heaves   Ext: no edema  Skin: no rashes      Results for orders placed or performed in visit on 11/09/22   Streptococcus A Rapid Screen w/Reflex to PCR - Clinic Collect     Status: Normal    Specimen: Throat; Swab   Result Value Ref Range    Group A Strep antigen Negative Negative   Group A Streptococcus PCR Throat Swab     Status: Normal    Specimen: Throat; Swab   Result Value Ref Range    Group A strep by " PCR Not Detected Not Detected    Narrative    The Xpert Xpress Strep A test, performed on the [employer]  Instrument Systems, is a rapid, qualitative in vitro diagnostic test for the detection of Streptococcus pyogenes (Group A ß-hemolytic Streptococcus, Strep A) in throat swab specimens from patients with signs and symptoms of pharyngitis. The Xpert Xpress Strep A test can be used as an aid in the diagnosis of Group A Streptococcal pharyngitis. The assay is not intended to monitor treatment for Group A Streptococcus infections. The Xpert Xpress Strep A test utilizes an automated real-time polymerase chain reaction (PCR) to detect Streptococcus pyogenes DNA.

## 2023-01-03 ENCOUNTER — TELEPHONE (OUTPATIENT)
Dept: FAMILY MEDICINE | Facility: CLINIC | Age: 53
End: 2023-01-03

## 2023-01-03 NOTE — TELEPHONE ENCOUNTER
Spoke to Jono. Pt has not picked up since 4/2022 and paid cash d/t being cheaper than insurance. Pt still has refills until 4/2023 and pharmacist says we can ignore the request.

## 2023-01-03 NOTE — TELEPHONE ENCOUNTER
Fax from [employer] [facility] requesting alternative Rx for Fluticasone/salmeterol inhaler due to insurance change and it no longer being covered.    Alternatives are: Wixela or Dulera

## 2023-03-24 ENCOUNTER — TELEPHONE (OUTPATIENT)
Dept: FAMILY MEDICINE | Facility: CLINIC | Age: 53
End: 2023-03-24

## 2023-03-24 DIAGNOSIS — J45.41 MODERATE PERSISTENT ASTHMA WITH ACUTE EXACERBATION: ICD-10-CM

## 2023-03-29 ENCOUNTER — TELEPHONE (OUTPATIENT)
Dept: FAMILY MEDICINE | Facility: CLINIC | Age: 53
End: 2023-03-29
Payer: COMMERCIAL

## 2023-03-29 NOTE — TELEPHONE ENCOUNTER
DRUG CHANGE REQUEST    Medication fluticasone-salmeterol (AIRDUO RESPICLICK) 232-14 MCG/ACT inhaler was sent to [facility].    Pharmacy states insurance prefers:    Wixela; Advair HFA; Breo; Dulera; or Symbicort    Routed to PCP for review.

## 2023-03-30 NOTE — TELEPHONE ENCOUNTER
Please check with patient. I believe in the past she wanted to stay with the AirDuo regardless of insurance, but if she is OK with changing I am happy to do so. Thanks

## 2023-04-03 NOTE — TELEPHONE ENCOUNTER
Left message for pt to call back. Wanting to know if she wants to stay with AirDuo or switch to something that is covered by insurance.

## 2023-05-20 ENCOUNTER — HEALTH MAINTENANCE LETTER (OUTPATIENT)
Age: 53
End: 2023-05-20

## 2023-06-24 DIAGNOSIS — J45.41 MODERATE PERSISTENT ASTHMA WITH ACUTE EXACERBATION: ICD-10-CM

## 2023-06-25 NOTE — TELEPHONE ENCOUNTER
"Routing refill request to provider for review/approval because:  Labs not current:  ACT  Patient needs to be seen because it has been more than 1 year since last office visit.    Last Written Prescription Date:  4/20/22  Last Fill Quantity: 3,  # refills: 4   Last office visit provider:   11/9/22    Requested Prescriptions   Pending Prescriptions Disp Refills     fluticasone-salmeterol (AIRDUO RESPICLICK) 232-14 MCG/ACT inhaler [Pharmacy Med Name: FLUTICASONE/SALMETEROL 232/14MCG IN]       Sig: INHALE 1 PUFF INTO THE LUNGS TWICE DAILY       Inhaled Steroids Protocol Failed - 6/24/2023  3:43 AM        Failed - Asthma control assessment score within normal limits in last 6 months     Please review ACT score.           Failed - Recent (6 mo) or future (30 days) visit within the authorizing provider's specialty     Patient had office visit in the last 6 months or has a visit in the next 30 days with authorizing provider or within the authorizing provider's specialty.  See \"Patient Info\" tab in inbasket, or \"Choose Columns\" in Meds & Orders section of the refill encounter.            Passed - Patient is age 12 or older        Passed - Medication is active on med list       Long-Acting Beta Agonist Inhalers Protocol  Failed - 6/24/2023  3:43 AM        Failed - Asthma control assessment score within normal limits in last 6 months     Please review ACT score.           Failed - Recent (6 mo) or future (30 days) visit within the authorizing provider's specialty     Patient had office visit in the last 6 months or has a visit in the next 30 days with authorizing provider or within the authorizing provider's specialty.  See \"Patient Info\" tab in inbasket, or \"Choose Columns\" in Meds & Orders section of the refill encounter.            Passed - Patient is age 12 or older        Passed - Medication is active on med list             Catrachita Lehman RN 06/25/23 1:49 PM  "

## 2023-06-26 RX ORDER — FLUTICASONE PROPIONATE AND SALMETEROL 232; 14 UG/1; UG/1
POWDER, METERED RESPIRATORY (INHALATION)
Qty: 1 EACH | Refills: 0 | Status: SHIPPED | OUTPATIENT
Start: 2023-06-26 | End: 2023-09-07

## 2023-07-20 DIAGNOSIS — F33.9 RECURRENT DEPRESSIVE DISORDER (H): ICD-10-CM

## 2023-07-20 RX ORDER — FLUOXETINE 40 MG/1
40 CAPSULE ORAL 2 TIMES DAILY
Qty: 180 CAPSULE | Refills: 1 | Status: SHIPPED | OUTPATIENT
Start: 2023-07-20 | End: 2023-09-07

## 2023-07-20 NOTE — TELEPHONE ENCOUNTER
Patient has scheduled appt on 8.17.23 for med f/u. She was not able to get in any sooner, so she is requesting a one month supply as bridge until her appointment with Dr. Cr in August.

## 2023-07-20 NOTE — TELEPHONE ENCOUNTER
"Routing refill request to provider for review/approval because:  No PHQ-9 <5 in past 6 months    Last Written Prescription Date:  5/17/23  Last Fill Quantity: 180,  # refills: 0   Last office visit provider:  11/9/22     Requested Prescriptions   Pending Prescriptions Disp Refills    FLUoxetine (PROZAC) 40 MG capsule 180 capsule 0     Sig: Take 1 capsule (40 mg) by mouth 2 times daily       SSRIs Protocol Failed - 7/20/2023 11:16 AM        Failed - PHQ-9 score less than 5 in past 6 months     Please review last PHQ-9 score.           Passed - Medication is active on med list        Passed - Patient is age 18 or older        Passed - No active pregnancy on record        Passed - No positive pregnancy test in last 12 months        Passed - Recent (6 mo) or future (30 days) visit within the authorizing provider's specialty     Patient had office visit in the last 6 months or has a visit in the next 30 days with authorizing provider or within the authorizing provider's specialty.  See \"Patient Info\" tab in inbasket, or \"Choose Columns\" in Meds & Orders section of the refill encounter.                 BUFFY FU RN 07/20/23 3:09 PM  "

## 2023-07-20 NOTE — TELEPHONE ENCOUNTER
Attempted to call patient but signal was not good and Lilia was cutting out. Please try back at a later time.

## 2023-09-06 ASSESSMENT — PATIENT HEALTH QUESTIONNAIRE - PHQ9
SUM OF ALL RESPONSES TO PHQ QUESTIONS 1-9: 8
SUM OF ALL RESPONSES TO PHQ QUESTIONS 1-9: 8
10. IF YOU CHECKED OFF ANY PROBLEMS, HOW DIFFICULT HAVE THESE PROBLEMS MADE IT FOR YOU TO DO YOUR WORK, TAKE CARE OF THINGS AT HOME, OR GET ALONG WITH OTHER PEOPLE: NOT DIFFICULT AT ALL

## 2023-09-06 ASSESSMENT — ASTHMA QUESTIONNAIRES: ACT_TOTALSCORE: 22

## 2023-09-07 ENCOUNTER — OFFICE VISIT (OUTPATIENT)
Dept: FAMILY MEDICINE | Facility: CLINIC | Age: 53
End: 2023-09-07
Payer: COMMERCIAL

## 2023-09-07 VITALS
HEART RATE: 70 BPM | WEIGHT: 257.8 LBS | DIASTOLIC BLOOD PRESSURE: 74 MMHG | SYSTOLIC BLOOD PRESSURE: 128 MMHG | HEIGHT: 66 IN | BODY MASS INDEX: 41.43 KG/M2 | OXYGEN SATURATION: 97 % | RESPIRATION RATE: 14 BRPM

## 2023-09-07 DIAGNOSIS — Z12.31 VISIT FOR SCREENING MAMMOGRAM: ICD-10-CM

## 2023-09-07 DIAGNOSIS — N95.2 VAGINAL ATROPHY: ICD-10-CM

## 2023-09-07 DIAGNOSIS — J45.40 MODERATE PERSISTENT ASTHMA WITHOUT COMPLICATION: ICD-10-CM

## 2023-09-07 DIAGNOSIS — Z13.6 SCREENING FOR CARDIOVASCULAR CONDITION: ICD-10-CM

## 2023-09-07 DIAGNOSIS — J06.9 UPPER RESPIRATORY TRACT INFECTION, UNSPECIFIED TYPE: ICD-10-CM

## 2023-09-07 DIAGNOSIS — Z12.11 SCREEN FOR COLON CANCER: Primary | ICD-10-CM

## 2023-09-07 DIAGNOSIS — F33.9 RECURRENT DEPRESSIVE DISORDER (H): ICD-10-CM

## 2023-09-07 DIAGNOSIS — Z13.1 SCREENING FOR DIABETES MELLITUS: ICD-10-CM

## 2023-09-07 PROCEDURE — 90677 PCV20 VACCINE IM: CPT | Performed by: FAMILY MEDICINE

## 2023-09-07 PROCEDURE — 90750 HZV VACC RECOMBINANT IM: CPT | Performed by: FAMILY MEDICINE

## 2023-09-07 PROCEDURE — 90472 IMMUNIZATION ADMIN EACH ADD: CPT | Performed by: FAMILY MEDICINE

## 2023-09-07 PROCEDURE — 99214 OFFICE O/P EST MOD 30 MIN: CPT | Mod: 25 | Performed by: FAMILY MEDICINE

## 2023-09-07 PROCEDURE — 90471 IMMUNIZATION ADMIN: CPT | Performed by: FAMILY MEDICINE

## 2023-09-07 PROCEDURE — 90682 RIV4 VACC RECOMBINANT DNA IM: CPT | Performed by: FAMILY MEDICINE

## 2023-09-07 RX ORDER — FLUTICASONE PROPIONATE AND SALMETEROL 232; 14 UG/1; UG/1
1 POWDER, METERED RESPIRATORY (INHALATION) 2 TIMES DAILY
Qty: 1 EACH | Refills: 11 | Status: SHIPPED | OUTPATIENT
Start: 2023-09-07

## 2023-09-07 RX ORDER — ESTRADIOL 0.1 MG/G
2 CREAM VAGINAL
Qty: 42.5 G | Refills: 5 | Status: SHIPPED | OUTPATIENT
Start: 2023-09-08

## 2023-09-07 RX ORDER — PREDNISONE 10 MG/1
TABLET ORAL
Qty: 40 TABLET | Refills: 1 | Status: SHIPPED | OUTPATIENT
Start: 2023-09-07 | End: 2023-09-23

## 2023-09-07 RX ORDER — ALBUTEROL SULFATE 90 UG/1
2 AEROSOL, METERED RESPIRATORY (INHALATION) EVERY 6 HOURS
Qty: 18 G | Refills: 11 | Status: SHIPPED | OUTPATIENT
Start: 2023-09-07

## 2023-09-07 RX ORDER — FLUOXETINE 40 MG/1
40 CAPSULE ORAL 2 TIMES DAILY
Qty: 180 CAPSULE | Refills: 3 | Status: SHIPPED | OUTPATIENT
Start: 2023-09-07

## 2023-09-07 NOTE — PROGRESS NOTES
"  Assessment & Plan     Moderate persistent asthma without complication  Asthma is under excellent control, will send in prednisone to use as needed per asthma action plan.  Uses air duo as needed as well.  - predniSONE (DELTASONE) 10 MG tablet; Take 4 tablets (40 mg) by mouth daily for 4 days, THEN 3 tablets (30 mg) daily for 4 days, THEN 2 tablets (20 mg) daily for 4 days, THEN 1 tablet (10 mg) daily for 4 days.  - fluticasone-salmeterol (AIRDUO RESPICLICK) 232-14 MCG/ACT inhaler; Inhale 1 puff into the lungs 2 times daily    Vaginal atrophy  Increased dyspareunia and vaginal dryness, will trial topical estradiol vaginal cream.  Discussed risk benefits and alternatives.  - estradiol (ESTRACE) 0.1 MG/GM vaginal cream; Place 2 g vaginally three times a week    Recurrent depressive disorder (H)  Well-controlled, continue current regimen.  She will confirm at home that she is taking 80 mg a day.  - FLUoxetine (PROZAC) 40 MG capsule; Take 1 capsule (40 mg) by mouth 2 times daily    Screening for diabetes mellitus  Labs today  - Glucose; Future    Screening for cardiovascular condition  Labs today  - Lipid panel reflex to direct LDL Fasting; Future    Upper respiratory tract infection, unspecified type  Refilled albuterol to use as needed for upper respiratory infections given history of asthma.  - albuterol (PROAIR HFA/PROVENTIL HFA/VENTOLIN HFA) 108 (90 Base) MCG/ACT inhaler; Inhale 2 puffs into the lungs every 6 hours    Screen for colon cancer  Referred for Cologuard  - COLOGUARD([employer]); Future    Visit for screening mammogram  Referred for mammogram             BMI:   Estimated body mass index is 41.19 kg/m  as calculated from the following:    Height as of this encounter: 1.685 m (5' 6.34\").    Weight as of this encounter: 116.9 kg (257 lb 12.8 oz).   Weight management plan: Option of medication or referral to bariatric center.  She is currently going to have to switch clinics due to insurance change " "coming up soon.  Given that I have recommended that she follow-up with her new provider.  We did discuss diet and exercise recommendations which she is well aware of.    Patient will need to switch clinics due to insurance issues she will let me know if there are any concerns.    Darrick Cr MD  [facility]    Monica Rodrigues is a 53 year old, presenting for the following health issues:  Recheck Medication (Patient states no concerns, just needing refills.)        9/7/2023     7:32 AM   Additional Questions   Roomed by Candice MELCHOR CMA   Accompanied by None       History of Present Illness       Reason for visit:  Annual med refill    She eats 0-1 servings of fruits and vegetables daily.She consumes 0 sweetened beverage(s) daily.She exercises with enough effort to increase her heart rate 9 or less minutes per day.  She exercises with enough effort to increase her heart rate 3 or less days per week. She is missing 2 dose(s) of medications per week.  She is not taking prescribed medications regularly due to remembering to take.       Patient here to discuss asthma and depression which are both stable on current regimen.  Fasting for labs.  Has noticed increased dyspareunia and vaginal discomfort.  Notes dryness.  Wondering about options.            Review of Systems         Objective    /74   Pulse 70   Resp 14   Ht 1.685 m (5' 6.34\")   Wt 116.9 kg (257 lb 12.8 oz)   LMP  (LMP Unknown)   SpO2 97%   BMI 41.19 kg/m    Body mass index is 41.19 kg/m .  Physical Exam   GENERAL: healthy, alert and no distress  EYES: Eyes grossly normal to inspection, PERRL and conjunctivae and sclerae normal  HENT: ear canals and TM's normal, nose and mouth without ulcers or lesions  NECK: no adenopathy, no asymmetry, masses, or scars and thyroid normal to palpation  RESP: lungs clear to auscultation - no rales, rhonchi or wheezes  CV: regular rate and rhythm, normal S1 S2, no S3 or S4, no " murmur, click or rub, no peripheral edema and peripheral pulses strong  MS: no gross musculoskeletal defects noted, no edema  SKIN: no suspicious lesions or rashes  NEURO: Normal strength and tone, mentation intact and speech normal  PSYCH: mentation appears normal, affect normal/bright

## 2023-09-07 NOTE — LETTER
My Asthma Action Plan    Name: Lilia Burnham   YOB: 1970  Date: 9/7/2023   My doctor: Darrick Cr MD   My clinic: [facility]        My Control Medicine: Fluticasone propionate + salmeterol (Generic) - 232/14 mcg 1 puff twice a day as needed  My Rescue Medicine: Albuterol (Proair/Ventolin/Proventil HFA) 2-4 puffs EVERY 4 HOURS as needed. Use a spacer if recommended by your provider.  My Oral Steroid Medicine: Prednisone taper My Asthma Severity:   Moderate Persistent  Know your asthma triggers: upper respiratory infections and Patient is unaware of triggers               GREEN ZONE   Good Control  I feel good  No cough or wheeze  Can work, sleep and play without asthma symptoms       Take your asthma control medicine every day.     If exercise triggers your asthma, take your rescue medication  15 minutes before exercise or sports, and  During exercise if you have asthma symptoms  Spacer to use with inhaler: If you have a spacer, make sure to use it with your inhaler             YELLOW ZONE Getting Worse  I have ANY of these:  I do not feel good  Cough or wheeze  Chest feels tight  Wake up at night   Keep taking your Green Zone medications  Start taking your rescue medicine:  every 20 minutes for up to 1 hour. Then every 4 hours for 24-48 hours.  If you stay in the Yellow Zone for more than 12-24 hours, contact your doctor.  If you do not return to the Green Zone in 12-24 hours or you get worse, start taking your oral steroid medicine if prescribed by your provider.           RED ZONE Medical Alert - Get Help  I have ANY of these:  I feel awful  Medicine is not helping  Breathing getting harder  Trouble walking or talking  Nose opens wide to breathe       Take your rescue medicine NOW  If your provider has prescribed an oral steroid medicine, start taking it NOW  Call your doctor NOW  If you are still in the Red Zone after 20 minutes and you have not reached your  doctor:  Take your rescue medicine again and  Call 911 or go to the emergency room right away    See your regular doctor within 2 weeks of an Emergency Room or Urgent Care visit for follow-up treatment.          Annual Reminders:  Meet with Asthma Educator,  Flu Shot in the Fall, consider Pneumonia Vaccination for patients with asthma (aged 19 and older).    Pharmacy: [facility] #[id number] [facility] [address] [place] [address] AT [facility] &  [address]    Electronically signed by Darrick Cr MD   Date: 09/07/23                      Asthma Triggers  How To Control Things That Make Your Asthma Worse    Triggers are things that make your asthma worse.  Look at the list below to help you find your triggers and what you can do about them.  You can help prevent asthma flare-ups by staying away from your triggers.      Trigger                                                          What you can do   Cigarette Smoke  Tobacco smoke can make asthma worse. Do not allow smoking in your home, car or around you.  Be sure no one smokes at a child s day care or school.  If you smoke, ask your health care provider for ways to help you quit.  Ask family members to quit too.  Ask your health care provider for a referral to Quit Plan to help you quit smoking, or call 2-877-521-PLAN.     Colds, Flu, Bronchitis  These are common triggers of asthma. Wash your hands often.  Don t touch your eyes, nose or mouth.  Get a flu shot every year.     Dust Mites  These are tiny bugs that live in cloth or carpet. They are too small to see. Wash sheets and blankets in hot water every week.   Encase pillows and mattress in dust mite proof covers.  Avoid having carpet if you can. If you have carpet, vacuum weekly.   Use a dust mask and HEPA vacuum.   Pollen and Outdoor Mold  Some people are allergic to trees, grass, or weed pollen, or molds. Try to keep your windows closed.  Limit time out doors when pollen count is high.   Ask you health care  provider about taking medicine during allergy season.     Animal Dander  Some people are allergic to skin flakes, urine or saliva from pets with fur or feathers. Keep pets with fur or feathers out of your home.    If you can t keep the pet outdoors, then keep the pet out of your bedroom.  Keep the bedroom door closed.  Keep pets off cloth furniture and away from stuffed toys.     Mice, Rats, and Cockroaches   Some people are allergic to the waste from these pests.   Cover food and garbage.  Clean up spills and food crumbs.  Store grease in the refrigerator.   Keep food out of the bedroom.   Indoor Mold  This can be a trigger if your home has high moisture. Fix leaking faucets, pipes, or other sources of water.   Clean moldy surfaces.  Dehumidify basement if it is damp and smelly.   Smoke, Strong Odors, and Sprays  These can reduce air quality. Stay away from strong odors and sprays, such as perfume, powder, hair spray, paints, smoke incense, paint, cleaning products, candles and new carpet.   Exercise or Sports  Some people with asthma have this trigger. Be active!  Ask your doctor about taking medicine before sports or exercise to prevent symptoms.    Warm up for 5-10 minutes before and after sports or exercise.     Other Triggers of Asthma  Cold air:  Cover your nose and mouth with a scarf.  Sometimes laughing or crying can be a trigger.  Some medicines and food can trigger asthma.

## 2023-09-15 ENCOUNTER — OFFICE VISIT (OUTPATIENT)
Dept: FAMILY MEDICINE | Facility: CLINIC | Age: 53
End: 2023-09-15
Payer: COMMERCIAL

## 2023-09-15 VITALS
OXYGEN SATURATION: 95 % | HEART RATE: 73 BPM | DIASTOLIC BLOOD PRESSURE: 83 MMHG | RESPIRATION RATE: 14 BRPM | SYSTOLIC BLOOD PRESSURE: 122 MMHG | TEMPERATURE: 97.4 F

## 2023-09-15 DIAGNOSIS — M76.62 ACHILLES TENDINITIS, LEFT LEG: ICD-10-CM

## 2023-09-15 PROCEDURE — 99213 OFFICE O/P EST LOW 20 MIN: CPT | Performed by: INTERNAL MEDICINE

## 2023-09-15 NOTE — PROGRESS NOTES
Assessment & Plan     Achilles tendinitis, left leg  Occurring after described traumatic ankle injury approx 2 months prior  Point tenderness along Achilles insertion site  Discussed nature of acute inflammation and risk for potential Achilles rupture.  Given her degree of describe pain especially with ambulation I think best to provide cam boot walker to help neutralize her ankle/Achilles tendon   -Advised to wear for comfort does not need to wear exclusively  Okay with NSAID use and localized ice  Caution for the coming 6 to 8 weeks on not introducing any new unusual physical activities for potential increased risk of Achilles tendon rupture.    - Ankle/Foot Bracing Supplies DME Walking Boot; Left; Non-pneumatic             Andrzej Joseph MD  [facility] [place]    Subjective   Lilia is a 53 year old, presenting for the following health issues: Describes traumatic twisting of her left ankle approximate 2 months ago.  Since that time was had progressively worsening heel pain has been getting worse.  Has point tenderness along Achilles insertion point.  Has been trying to walk through it.  Describes painful to walk.  No recent antibiotic exposures.  Fall (Fell about 8 weeks ago. ) and Foot Pain (Left heel pain.)        9/15/2023     2:20 PM   Additional Questions   Roomed by Ramona HI           Review of Systems         Objective    /83   Pulse 73   Temp 97.4  F (36.3  C)   Resp 14   LMP  (LMP Unknown)   SpO2 95%   There is no height or weight on file to calculate BMI.  Physical Exam   Normal dorsiflexion plantarflexion of left foot is normal  Stanley testing normal  Focal point tenderness along insertion point of left Achilles tendon.  No pain more proximally along the Achilles tendon

## 2023-09-22 ENCOUNTER — MYC MEDICAL ADVICE (OUTPATIENT)
Dept: FAMILY MEDICINE | Facility: CLINIC | Age: 53
End: 2023-09-22
Payer: COMMERCIAL

## 2023-09-22 DIAGNOSIS — J45.41 MODERATE PERSISTENT ASTHMA WITH EXACERBATION: Primary | ICD-10-CM

## 2023-09-22 RX ORDER — AZITHROMYCIN 250 MG/1
TABLET, FILM COATED ORAL
Qty: 6 TABLET | Refills: 0 | Status: SHIPPED | OUTPATIENT
Start: 2023-09-22 | End: 2023-09-27

## 2023-09-25 NOTE — TELEPHONE ENCOUNTER
Routing message to PCP for review and advice on medication questions.    Call was made to pt; offered follow up visit with providers on 9/26 for asthma follow up; pt declined and was scheduled with PCP on 9/26.

## 2023-12-07 ENCOUNTER — LAB (OUTPATIENT)
Dept: FAMILY MEDICINE | Facility: CLINIC | Age: 53
End: 2023-12-07
Payer: COMMERCIAL

## 2023-12-07 DIAGNOSIS — Z12.11 SCREEN FOR COLON CANCER: ICD-10-CM

## 2023-12-31 ENCOUNTER — HEALTH MAINTENANCE LETTER (OUTPATIENT)
Age: 53
End: 2023-12-31

## 2024-01-04 LAB — NONINV COLON CA DNA+OCC BLD SCRN STL QL: NEGATIVE

## 2024-10-05 ENCOUNTER — HEALTH MAINTENANCE LETTER (OUTPATIENT)
Age: 54
End: 2024-10-05

## 2024-12-14 ENCOUNTER — HEALTH MAINTENANCE LETTER (OUTPATIENT)
Age: 54
End: 2024-12-14

## 2024-12-17 DIAGNOSIS — J45.40 MODERATE PERSISTENT ASTHMA WITHOUT COMPLICATION: ICD-10-CM

## 2024-12-18 DIAGNOSIS — J45.40 MODERATE PERSISTENT ASTHMA WITHOUT COMPLICATION: ICD-10-CM

## 2024-12-18 RX ORDER — FLUTICASONE PROPIONATE AND SALMETEROL 232; 14 UG/1; UG/1
1 POWDER, METERED RESPIRATORY (INHALATION) 2 TIMES DAILY
Qty: 1 EACH | Refills: 11 | Status: SHIPPED | OUTPATIENT
Start: 2024-12-18

## 2024-12-18 RX ORDER — FLUTICASONE PROPIONATE AND SALMETEROL 232; 14 UG/1; UG/1
1 POWDER, METERED RESPIRATORY (INHALATION) 2 TIMES DAILY
OUTPATIENT
Start: 2024-12-18

## 2025-04-12 DIAGNOSIS — J06.9 UPPER RESPIRATORY TRACT INFECTION, UNSPECIFIED TYPE: ICD-10-CM

## 2025-04-12 DIAGNOSIS — F33.9 RECURRENT DEPRESSIVE DISORDER: ICD-10-CM

## 2025-04-14 RX ORDER — FLUOXETINE HYDROCHLORIDE 40 MG/1
40 CAPSULE ORAL 2 TIMES DAILY
Qty: 180 CAPSULE | Refills: 3 | OUTPATIENT
Start: 2025-04-14

## 2025-04-14 RX ORDER — ALBUTEROL SULFATE 90 UG/1
2 INHALANT RESPIRATORY (INHALATION) EVERY 6 HOURS
Qty: 18 G | Refills: 11 | OUTPATIENT
Start: 2025-04-14

## 2025-04-14 NOTE — TELEPHONE ENCOUNTER
Clinic RN: Please investigate patient's chart or contact patient if the information cannot be found because patient should have run out of this medication on 9/2024. Confirm patient is taking this medication as prescribed. Document findings and route refill encounter to provider for approval or denial. Patient also due to OV/ annual visit

## 2025-04-14 NOTE — TELEPHONE ENCOUNTER
Call to patient. She does not need medication refills. She is seeing another care provider and they are managing her medications. No further assistance needed.

## 2025-07-11 ENCOUNTER — TELEPHONE (OUTPATIENT)
Dept: FAMILY MEDICINE | Facility: CLINIC | Age: 55
End: 2025-07-11
Payer: COMMERCIAL

## 2025-07-11 NOTE — TELEPHONE ENCOUNTER
Prior Authorization Retail Medication Request    Medication/Dose: FLUTIC/SALMET 232-14MCG 60 PUFFS  Diagnosis and ICD code (if different than what is on RX):     New/renewal/insurance change PA/secondary ins. PA:  Previously Tried and Failed:    Rationale:      Insurance   Primary: MEDICA CHOICE   Insurance ID:  [id number]     Secondary (if applicable):[employer]   Insurance ID:  [id number]     Pharmacy Information (if different than what is on RX)  Name:  [facility]    Phone:  [phone]  Fax:[phone]    Clinic Information [facility] [place]   Preferred routing pool for dept communication: [facility] Pool

## 2025-07-15 NOTE — TELEPHONE ENCOUNTER
Central Prior Authorization Team  Phone: [phone]    PA Initiation    Medication: FLUTICASONE-SALMETEROL 232-14 MCG/ACT IN AEPB  Insurance Company: [employer] - Phone [phone] Fax [phone]  Pharmacy Filling the Rx: [facility] #[id number] [facility] [address]  AT [facility] [address]  Filling Pharmacy Phone: [phone]  Filling Pharmacy Fax: [phone]  Start Date: 7/15/2025

## 2025-07-15 NOTE — TELEPHONE ENCOUNTER
Prior Authorization Approval    Medication: FLUTICASONE-SALMETEROL 232-14 MCG/ACT IN AEPB  Authorization Effective Date: 6/15/2025  Authorization Expiration Date: 7/15/2026  Approved Dose/Quantity:   Reference #:     Insurance Company: [employer] - Phone [phone] Fax [phone]  Expected CoPay: $    CoPay Card Available:      Financial Assistance Needed:   Which Pharm[facility] filling the prescription[id number] [place] [address] [address] [facility] AT [facility] OF [address] & [facility]  Pharmacy Notified: Yes  Patient Notified: **Instructed pharmacy to notify patient when script is ready to /ship.**